# Patient Record
Sex: MALE | Race: WHITE | NOT HISPANIC OR LATINO | URBAN - METROPOLITAN AREA
[De-identification: names, ages, dates, MRNs, and addresses within clinical notes are randomized per-mention and may not be internally consistent; named-entity substitution may affect disease eponyms.]

---

## 2017-01-03 ENCOUNTER — APPOINTMENT (RX ONLY)
Dept: URBAN - METROPOLITAN AREA CLINIC 157 | Facility: CLINIC | Age: 62
Setting detail: DERMATOLOGY
End: 2017-01-03

## 2017-01-03 DIAGNOSIS — L73.8 OTHER SPECIFIED FOLLICULAR DISORDERS: ICD-10-CM

## 2017-01-03 DIAGNOSIS — L82.0 INFLAMED SEBORRHEIC KERATOSIS: ICD-10-CM

## 2017-01-03 PROBLEM — D48.5 NEOPLASM OF UNCERTAIN BEHAVIOR OF SKIN: Status: ACTIVE | Noted: 2017-01-03

## 2017-01-03 PROBLEM — D23.5 OTHER BENIGN NEOPLASM OF SKIN OF TRUNK: Status: ACTIVE | Noted: 2017-01-03

## 2017-01-03 PROCEDURE — 11100: CPT

## 2017-01-03 PROCEDURE — 99214 OFFICE O/P EST MOD 30 MIN: CPT | Mod: 25

## 2017-01-03 PROCEDURE — ? BIOPSY BY SHAVE METHOD

## 2017-01-03 PROCEDURE — ? COUNSELING

## 2017-01-03 ASSESSMENT — LOCATION SIMPLE DESCRIPTION DERM
LOCATION SIMPLE: LEFT CHEEK
LOCATION SIMPLE: ABDOMEN

## 2017-01-03 ASSESSMENT — LOCATION ZONE DERM
LOCATION ZONE: TRUNK
LOCATION ZONE: FACE

## 2017-01-03 ASSESSMENT — LOCATION DETAILED DESCRIPTION DERM
LOCATION DETAILED: RIGHT LATERAL ABDOMEN
LOCATION DETAILED: LEFT INFERIOR CENTRAL MALAR CHEEK

## 2017-01-03 NOTE — PROCEDURE: BIOPSY BY SHAVE METHOD
Biopsy Method: sterile single edge surgical blade
Electrodesiccation And Curettage Text: The wound bed was treated with electrodesiccation and curettage after the biopsy was performed.
Dressing: bandage
X Size Of Lesion In Cm: 0
Post-Care Instructions: I reviewed with the patient in detail post-care instructions. Patient is to keep the biopsy site dry overnight, and then apply bacitracin twice daily until healed. Patient may apply hydrogen peroxide soaks to remove any crusting.
Lab: [id number]
Bill 50449 For Specimen Handling/Conveyance To Laboratory?: no
Biopsy Type: H and E
Detail Level: Detailed
Cryotherapy Text: The wound bed was treated with cryotherapy after the biopsy was performed.
Electrodesiccation Text: The wound bed was treated with electrodesiccation after the biopsy was performed.
Billing Type: Third-Party Bill
Wound Care: Bacitracin
Anesthesia Type: 2% lidocaine with epinephrine
Render Post-Care Instructions In Note?: yes
Hemostasis: Drysol
Consent: Written consent was obtained and risks were reviewed including but not limited to scarring, infection, bleeding, scabbing, incomplete removal, nerve damage and allergy to anesthesia.
Notification Instructions: Patient will be notified of biopsy results. However, patient instructed to call the office if not contacted within 2 weeks.
Type Of Destruction Used: Curettage
Anesthesia Volume In Cc (Will Not Render If 0): 0.5
Curettage Text: The wound bed was treated with curettage after the biops
Lab Facility: [id number]
Silver Nitrate Text: The wound bed was treated with silver nitrate after the biopsy was performed.

## 2017-09-05 ENCOUNTER — APPOINTMENT (RX ONLY)
Dept: URBAN - METROPOLITAN AREA CLINIC 157 | Facility: CLINIC | Age: 62
Setting detail: DERMATOLOGY
End: 2017-09-05

## 2017-09-05 VITALS
WEIGHT: 170 LBS | DIASTOLIC BLOOD PRESSURE: 69 MMHG | SYSTOLIC BLOOD PRESSURE: 111 MMHG | HEIGHT: 71 IN | HEART RATE: 71 BPM

## 2017-09-05 DIAGNOSIS — L82.1 OTHER SEBORRHEIC KERATOSIS: ICD-10-CM

## 2017-09-05 DIAGNOSIS — L72.8 OTHER FOLLICULAR CYSTS OF THE SKIN AND SUBCUTANEOUS TISSUE: ICD-10-CM

## 2017-09-05 PROBLEM — Z85.46 PERSONAL HISTORY OF MALIGNANT NEOPLASM OF PROSTATE: Status: ACTIVE | Noted: 2017-09-05

## 2017-09-05 PROCEDURE — ? COUNSELING

## 2017-09-05 PROCEDURE — 99213 OFFICE O/P EST LOW 20 MIN: CPT

## 2017-09-05 ASSESSMENT — LOCATION ZONE DERM: LOCATION ZONE: TRUNK

## 2017-09-05 ASSESSMENT — LOCATION SIMPLE DESCRIPTION DERM
LOCATION SIMPLE: UPPER BACK
LOCATION SIMPLE: RIGHT UPPER BACK

## 2017-09-05 ASSESSMENT — LOCATION DETAILED DESCRIPTION DERM
LOCATION DETAILED: INFERIOR THORACIC SPINE
LOCATION DETAILED: RIGHT SUPERIOR UPPER BACK

## 2022-09-15 ENCOUNTER — PATIENT MESSAGE (OUTPATIENT)
Dept: FAMILY MEDICINE CLINIC | Facility: CLINIC | Age: 67
End: 2022-09-15

## 2022-09-15 NOTE — TELEPHONE ENCOUNTER
From: Margo Madrid  Sent: 9/15/2022 1:11 PM CDT  To: [facility] Clinical Staff  Subject: Upcoming Appointment    Hayes Carrasco. I was not able to complete the e-check in. It wouldn't accept my Medicare card number for some reason. I called the [facility] help number and they said to just check in when I get there so will come a bit early to do that. I am thinking that I am listed as Margo Madrid in [address] and as Berwyn Dandy on my Medicare card. Also, I was wondering if I could go ahead and get my flu shot tomorrow? Thanks.   [facility]

## 2022-09-16 ENCOUNTER — OFFICE VISIT (OUTPATIENT)
Dept: FAMILY MEDICINE CLINIC | Facility: CLINIC | Age: 67
End: 2022-09-16
Payer: MEDICARE

## 2022-09-16 ENCOUNTER — LAB ENCOUNTER (OUTPATIENT)
Dept: LAB | Age: 67
End: 2022-09-16
Attending: FAMILY MEDICINE
Payer: MEDICARE

## 2022-09-16 VITALS
HEIGHT: 69 IN | OXYGEN SATURATION: 98 % | RESPIRATION RATE: 16 BRPM | SYSTOLIC BLOOD PRESSURE: 104 MMHG | DIASTOLIC BLOOD PRESSURE: 70 MMHG | WEIGHT: 182 LBS | BODY MASS INDEX: 26.96 KG/M2 | HEART RATE: 66 BPM

## 2022-09-16 DIAGNOSIS — M10.9 GOUT, UNSPECIFIED CAUSE, UNSPECIFIED CHRONICITY, UNSPECIFIED SITE: ICD-10-CM

## 2022-09-16 DIAGNOSIS — Z85.46 HISTORY OF PROSTATE CANCER: ICD-10-CM

## 2022-09-16 DIAGNOSIS — Z00.00 ENCOUNTER FOR ANNUAL HEALTH EXAMINATION: Primary | ICD-10-CM

## 2022-09-16 DIAGNOSIS — K42.9 UMBILICAL HERNIA WITHOUT OBSTRUCTION AND WITHOUT GANGRENE: ICD-10-CM

## 2022-09-16 LAB
ALBUMIN SERPL-MCNC: 3.8 G/DL (ref 3.4–5)
ALBUMIN/GLOB SERPL: 1.1 {RATIO} (ref 1–2)
ALP LIVER SERPL-CCNC: 50 U/L
ALT SERPL-CCNC: 43 U/L
ANION GAP SERPL CALC-SCNC: 6 MMOL/L (ref 0–18)
AST SERPL-CCNC: 15 U/L (ref 15–37)
BILIRUB SERPL-MCNC: 1.1 MG/DL (ref 0.1–2)
BUN BLD-MCNC: 16 MG/DL (ref 7–18)
CALCIUM BLD-MCNC: 9.1 MG/DL (ref 8.5–10.1)
CHLORIDE SERPL-SCNC: 106 MMOL/L (ref 98–112)
CO2 SERPL-SCNC: 28 MMOL/L (ref 21–32)
CREAT BLD-MCNC: 1.11 MG/DL
FASTING STATUS PATIENT QL REPORTED: YES
GFR SERPLBLD BASED ON 1.73 SQ M-ARVRAT: 73 ML/MIN/1.73M2 (ref 60–?)
GLOBULIN PLAS-MCNC: 3.5 G/DL (ref 2.8–4.4)
GLUCOSE BLD-MCNC: 101 MG/DL (ref 70–99)
OSMOLALITY SERPL CALC.SUM OF ELEC: 291 MOSM/KG (ref 275–295)
POTASSIUM SERPL-SCNC: 4.2 MMOL/L (ref 3.5–5.1)
PROT SERPL-MCNC: 7.3 G/DL (ref 6.4–8.2)
SODIUM SERPL-SCNC: 140 MMOL/L (ref 136–145)
URATE SERPL-MCNC: 5.5 MG/DL

## 2022-09-16 PROCEDURE — 80053 COMPREHEN METABOLIC PANEL: CPT

## 2022-09-16 PROCEDURE — 36415 COLL VENOUS BLD VENIPUNCTURE: CPT

## 2022-09-16 PROCEDURE — 84550 ASSAY OF BLOOD/URIC ACID: CPT

## 2022-09-16 PROCEDURE — 99203 OFFICE O/P NEW LOW 30 MIN: CPT | Performed by: FAMILY MEDICINE

## 2022-09-16 RX ORDER — LORATADINE 10 MG/1
10 TABLET ORAL DAILY
COMMUNITY

## 2022-09-16 RX ORDER — ATORVASTATIN CALCIUM 10 MG/1
10 TABLET, FILM COATED ORAL NIGHTLY
COMMUNITY
Start: 2022-07-11 | End: 2022-10-09

## 2022-09-16 RX ORDER — FLUTICASONE PROPIONATE 50 MCG
SPRAY, SUSPENSION (ML) NASAL DAILY
COMMUNITY

## 2022-09-16 RX ORDER — ALLOPURINOL 300 MG/1
150 TABLET ORAL
COMMUNITY
Start: 2022-07-11

## 2022-09-16 RX ORDER — ASPIRIN 81 MG/1
81 TABLET ORAL DAILY
COMMUNITY

## 2022-10-07 ENCOUNTER — NURSE ONLY (OUTPATIENT)
Dept: FAMILY MEDICINE CLINIC | Facility: CLINIC | Age: 67
End: 2022-10-07
Payer: MEDICARE

## 2022-10-07 PROCEDURE — 90662 IIV NO PRSV INCREASED AG IM: CPT | Performed by: FAMILY MEDICINE

## 2022-10-07 PROCEDURE — G0008 ADMIN INFLUENZA VIRUS VAC: HCPCS | Performed by: FAMILY MEDICINE

## 2022-10-18 ENCOUNTER — PATIENT MESSAGE (OUTPATIENT)
Dept: FAMILY MEDICINE CLINIC | Facility: CLINIC | Age: 67
End: 2022-10-18

## 2022-10-19 RX ORDER — ATORVASTATIN CALCIUM 10 MG/1
10 TABLET, FILM COATED ORAL NIGHTLY
Qty: 90 TABLET | Refills: 0 | Status: SHIPPED | OUTPATIENT
Start: 2022-10-19

## 2022-10-19 NOTE — TELEPHONE ENCOUNTER
From: Aneesh Bright  To: Rosa Willis MD  Sent: 10/18/2022 6:40 PM CDT  Subject: Prescription for atorvastatin    I have been on atorvastatin 10 mg once per day. I thought it was listed during my last appointment, but I can't find it in my medication list. I need to get a refill for this please. We use the [facility] at [address] and [address] in [place]. Thanks!   Leandro Cervantes

## 2022-10-26 ENCOUNTER — PATIENT MESSAGE (OUTPATIENT)
Dept: FAMILY MEDICINE CLINIC | Facility: CLINIC | Age: 67
End: 2022-10-26

## 2022-10-26 NOTE — TELEPHONE ENCOUNTER
From: Adrien Russ  To: Abigail Smith MD  Sent: 10/26/2022 2:05 PM CDT  Subject: COVID booster shot    I was wondering if it would be possible for myself and also my wife (Regina Meadows [id number], also a patient of Dr. Ana María Vale) to come in on Thursday (tomorrow 10-) early afternoon to get our Covid booster shots? Thanks.   Vania Faria

## 2022-12-05 ENCOUNTER — OFFICE VISIT (OUTPATIENT)
Facility: LOCATION | Age: 67
End: 2022-12-05
Payer: MEDICARE

## 2022-12-05 VITALS — HEART RATE: 99 BPM | TEMPERATURE: 98 F

## 2022-12-05 DIAGNOSIS — Z90.79 HISTORY OF ROBOT-ASSISTED LAPAROSCOPIC RADICAL PROSTATECTOMY: ICD-10-CM

## 2022-12-05 DIAGNOSIS — K43.2 INCISIONAL HERNIA, WITHOUT OBSTRUCTION OR GANGRENE: Primary | ICD-10-CM

## 2022-12-05 PROCEDURE — 99205 OFFICE O/P NEW HI 60 MIN: CPT | Performed by: SURGERY

## 2022-12-07 DIAGNOSIS — K43.9 VENTRAL HERNIA WITHOUT OBSTRUCTION OR GANGRENE: Primary | ICD-10-CM

## 2023-01-11 RX ORDER — ALLOPURINOL 300 MG/1
150 TABLET ORAL DAILY
Qty: 45 TABLET | Refills: 1 | Status: SHIPPED | OUTPATIENT
Start: 2023-01-11

## 2023-01-11 RX ORDER — ATORVASTATIN CALCIUM 10 MG/1
10 TABLET, FILM COATED ORAL NIGHTLY
Qty: 90 TABLET | Refills: 0 | Status: SHIPPED | OUTPATIENT
Start: 2023-01-11

## 2023-02-06 ENCOUNTER — OFFICE VISIT (OUTPATIENT)
Dept: FAMILY MEDICINE CLINIC | Facility: CLINIC | Age: 68
End: 2023-02-06
Payer: MEDICARE

## 2023-02-06 VITALS
RESPIRATION RATE: 18 BRPM | HEART RATE: 78 BPM | DIASTOLIC BLOOD PRESSURE: 76 MMHG | OXYGEN SATURATION: 99 % | BODY MASS INDEX: 28.29 KG/M2 | HEIGHT: 69 IN | SYSTOLIC BLOOD PRESSURE: 108 MMHG | WEIGHT: 191 LBS

## 2023-02-06 DIAGNOSIS — L98.9 SKIN LESIONS: ICD-10-CM

## 2023-02-06 DIAGNOSIS — L91.8 SKIN TAG: Primary | ICD-10-CM

## 2023-02-20 ENCOUNTER — PATIENT MESSAGE (OUTPATIENT)
Facility: LOCATION | Age: 68
End: 2023-02-20

## 2023-02-20 NOTE — TELEPHONE ENCOUNTER
From: Greta Robles  To: Esdras Covarrubias MD  Sent: 2/20/2023 10:20 AM CST  Subject: question about meds prior to proceedure    I have continued to take allopurinal and atorvastatin and have a procedure scheduled for Monday Feb 27. I stopped aspirin and vitamins on Thursday Feb 16. I also stopped taking loratidine, but was wondering if it is ok to continue taking it.  Thanks

## 2023-02-25 ENCOUNTER — LAB ENCOUNTER (OUTPATIENT)
Dept: LAB | Facility: HOSPITAL | Age: 68
End: 2023-02-25
Attending: SURGERY
Payer: MEDICARE

## 2023-02-25 DIAGNOSIS — Z01.818 PRE-OP TESTING: ICD-10-CM

## 2023-02-25 LAB — SARS-COV-2 RNA RESP QL NAA+PROBE: NOT DETECTED

## 2023-02-26 ENCOUNTER — ANESTHESIA EVENT (OUTPATIENT)
Dept: SURGERY | Facility: HOSPITAL | Age: 68
End: 2023-02-26
Payer: MEDICARE

## 2023-02-27 ENCOUNTER — HOSPITAL ENCOUNTER (OUTPATIENT)
Facility: HOSPITAL | Age: 68
Setting detail: HOSPITAL OUTPATIENT SURGERY
Discharge: HOME | End: 2023-02-27
Attending: SURGERY | Admitting: SURGERY
Payer: MEDICARE

## 2023-02-27 ENCOUNTER — ANESTHESIA (OUTPATIENT)
Dept: SURGERY | Facility: HOSPITAL | Age: 68
End: 2023-02-27
Payer: MEDICARE

## 2023-02-27 VITALS
OXYGEN SATURATION: 94 % | WEIGHT: 186 LBS | HEART RATE: 71 BPM | SYSTOLIC BLOOD PRESSURE: 124 MMHG | RESPIRATION RATE: 16 BRPM | HEIGHT: 69 IN | TEMPERATURE: 99 F | DIASTOLIC BLOOD PRESSURE: 66 MMHG | BODY MASS INDEX: 27.55 KG/M2

## 2023-02-27 DIAGNOSIS — Z01.818 PRE-OP TESTING: Primary | ICD-10-CM

## 2023-02-27 PROCEDURE — 0WQF0ZZ REPAIR ABDOMINAL WALL, OPEN APPROACH: ICD-10-PCS | Performed by: SURGERY

## 2023-02-27 RX ORDER — HYDROCODONE BITARTRATE AND ACETAMINOPHEN 5; 325 MG/1; MG/1
2 TABLET ORAL ONCE AS NEEDED
Status: DISCONTINUED | OUTPATIENT
Start: 2023-02-27 | End: 2023-02-27

## 2023-02-27 RX ORDER — SODIUM CHLORIDE, SODIUM LACTATE, POTASSIUM CHLORIDE, CALCIUM CHLORIDE 600; 310; 30; 20 MG/100ML; MG/100ML; MG/100ML; MG/100ML
INJECTION, SOLUTION INTRAVENOUS CONTINUOUS
Status: DISCONTINUED | OUTPATIENT
Start: 2023-02-27 | End: 2023-02-27

## 2023-02-27 RX ORDER — HYDROCODONE BITARTRATE AND ACETAMINOPHEN 5; 325 MG/1; MG/1
1 TABLET ORAL ONCE AS NEEDED
Status: DISCONTINUED | OUTPATIENT
Start: 2023-02-27 | End: 2023-02-27

## 2023-02-27 RX ORDER — ACETAMINOPHEN 500 MG
1000 TABLET ORAL ONCE AS NEEDED
Status: DISCONTINUED | OUTPATIENT
Start: 2023-02-27 | End: 2023-02-27

## 2023-02-27 RX ORDER — LIDOCAINE HYDROCHLORIDE 10 MG/ML
INJECTION, SOLUTION EPIDURAL; INFILTRATION; INTRACAUDAL; PERINEURAL AS NEEDED
Status: DISCONTINUED | OUTPATIENT
Start: 2023-02-27 | End: 2023-02-27 | Stop reason: SURG

## 2023-02-27 RX ORDER — LIDOCAINE HYDROCHLORIDE AND EPINEPHRINE 10; 10 MG/ML; UG/ML
INJECTION, SOLUTION INFILTRATION; PERINEURAL AS NEEDED
Status: DISCONTINUED | OUTPATIENT
Start: 2023-02-27 | End: 2023-02-27 | Stop reason: HOSPADM

## 2023-02-27 RX ORDER — ACETAMINOPHEN 500 MG
1000 TABLET ORAL ONCE
Status: DISCONTINUED | OUTPATIENT
Start: 2023-02-27 | End: 2023-02-27 | Stop reason: HOSPADM

## 2023-02-27 RX ORDER — CEFAZOLIN SODIUM/WATER 2 G/20 ML
2 SYRINGE (ML) INTRAVENOUS ONCE
Status: COMPLETED | OUTPATIENT
Start: 2023-02-27 | End: 2023-02-27

## 2023-02-27 RX ORDER — HYDROMORPHONE HYDROCHLORIDE 1 MG/ML
0.6 INJECTION, SOLUTION INTRAMUSCULAR; INTRAVENOUS; SUBCUTANEOUS EVERY 5 MIN PRN
Status: DISCONTINUED | OUTPATIENT
Start: 2023-02-27 | End: 2023-02-27

## 2023-02-27 RX ORDER — HYDROMORPHONE HYDROCHLORIDE 1 MG/ML
0.4 INJECTION, SOLUTION INTRAMUSCULAR; INTRAVENOUS; SUBCUTANEOUS EVERY 5 MIN PRN
Status: DISCONTINUED | OUTPATIENT
Start: 2023-02-27 | End: 2023-02-27

## 2023-02-27 RX ORDER — HEPARIN SODIUM 5000 [USP'U]/ML
5000 INJECTION, SOLUTION INTRAVENOUS; SUBCUTANEOUS ONCE
Status: COMPLETED | OUTPATIENT
Start: 2023-02-27 | End: 2023-02-27

## 2023-02-27 RX ORDER — HYDROMORPHONE HYDROCHLORIDE 1 MG/ML
0.2 INJECTION, SOLUTION INTRAMUSCULAR; INTRAVENOUS; SUBCUTANEOUS EVERY 5 MIN PRN
Status: DISCONTINUED | OUTPATIENT
Start: 2023-02-27 | End: 2023-02-27

## 2023-02-27 RX ORDER — HYDROCODONE BITARTRATE AND ACETAMINOPHEN 5; 325 MG/1; MG/1
1 TABLET ORAL EVERY 6 HOURS PRN
Qty: 15 TABLET | Refills: 0 | Status: SHIPPED | OUTPATIENT
Start: 2023-02-27

## 2023-02-27 RX ORDER — ONDANSETRON 2 MG/ML
INJECTION INTRAMUSCULAR; INTRAVENOUS AS NEEDED
Status: DISCONTINUED | OUTPATIENT
Start: 2023-02-27 | End: 2023-02-27 | Stop reason: SURG

## 2023-02-27 RX ORDER — NALOXONE HYDROCHLORIDE 0.4 MG/ML
80 INJECTION, SOLUTION INTRAMUSCULAR; INTRAVENOUS; SUBCUTANEOUS AS NEEDED
Status: DISCONTINUED | OUTPATIENT
Start: 2023-02-27 | End: 2023-02-27

## 2023-02-27 RX ORDER — DEXAMETHASONE SODIUM PHOSPHATE 4 MG/ML
VIAL (ML) INJECTION AS NEEDED
Status: DISCONTINUED | OUTPATIENT
Start: 2023-02-27 | End: 2023-02-27 | Stop reason: SURG

## 2023-02-27 RX ORDER — ONDANSETRON 2 MG/ML
4 INJECTION INTRAMUSCULAR; INTRAVENOUS EVERY 6 HOURS PRN
Status: DISCONTINUED | OUTPATIENT
Start: 2023-02-27 | End: 2023-02-27

## 2023-02-27 RX ORDER — METOCLOPRAMIDE HYDROCHLORIDE 5 MG/ML
10 INJECTION INTRAMUSCULAR; INTRAVENOUS EVERY 8 HOURS PRN
Status: DISCONTINUED | OUTPATIENT
Start: 2023-02-27 | End: 2023-02-27

## 2023-02-27 RX ORDER — KETOROLAC TROMETHAMINE 30 MG/ML
INJECTION, SOLUTION INTRAMUSCULAR; INTRAVENOUS AS NEEDED
Status: DISCONTINUED | OUTPATIENT
Start: 2023-02-27 | End: 2023-02-27 | Stop reason: SURG

## 2023-02-27 RX ORDER — BUPIVACAINE HYDROCHLORIDE 5 MG/ML
INJECTION, SOLUTION EPIDURAL; INTRACAUDAL AS NEEDED
Status: DISCONTINUED | OUTPATIENT
Start: 2023-02-27 | End: 2023-02-27 | Stop reason: HOSPADM

## 2023-02-27 RX ADMIN — CEFAZOLIN SODIUM/WATER 2 G: 2 G/20 ML SYRINGE (ML) INTRAVENOUS at 12:31:00

## 2023-02-27 RX ADMIN — LIDOCAINE HYDROCHLORIDE 50 MG: 10 INJECTION, SOLUTION EPIDURAL; INFILTRATION; INTRACAUDAL; PERINEURAL at 12:31:00

## 2023-02-27 RX ADMIN — SODIUM CHLORIDE, SODIUM LACTATE, POTASSIUM CHLORIDE, CALCIUM CHLORIDE: 600; 310; 30; 20 INJECTION, SOLUTION INTRAVENOUS at 13:17:00

## 2023-02-27 RX ADMIN — KETOROLAC TROMETHAMINE 30 MG: 30 INJECTION, SOLUTION INTRAMUSCULAR; INTRAVENOUS at 13:00:00

## 2023-02-27 RX ADMIN — SODIUM CHLORIDE, SODIUM LACTATE, POTASSIUM CHLORIDE, CALCIUM CHLORIDE: 600; 310; 30; 20 INJECTION, SOLUTION INTRAVENOUS at 12:28:00

## 2023-02-27 RX ADMIN — DEXAMETHASONE SODIUM PHOSPHATE 4 MG: 4 MG/ML VIAL (ML) INJECTION at 12:36:00

## 2023-02-27 RX ADMIN — ONDANSETRON 4 MG: 2 INJECTION INTRAMUSCULAR; INTRAVENOUS at 13:00:00

## 2023-02-27 NOTE — ANESTHESIA POSTPROCEDURE EVALUATION
[address] Patient Status:  Hospital Outpatient Surgery   Age/Gender 79year old male MRN [id number]   [facility] SURGERY Attending Jeffery Lowry MD   [facility] Day # 0 PCP Wilfredo Arredondo MD       Anesthesia Post-op Note    VENTRAL HERNIA REPAIR    Procedure Summary     Date: 02/27/23 Room / Location: [facility] MAIN OR 04 / [facility] MAIN OR    Anesthesia Start: 8810 Anesthesia Stop: 5967    Procedure: VENTRAL HERNIA REPAIR Diagnosis:       Ventral hernia without obstruction or gangrene      (Ventral hernia without obstruction or gangrene [K43.9])    Surgeons: Jeffery Lowry MD Anesthesiologist: Krysten Díaz MD    Anesthesia Type: general ASA Status: 2          Anesthesia Type: general    Vitals Value Taken Time   /59 02/27/23 1317   Temp 97.5 02/27/23 1317   Pulse 78 02/27/23 1317   Resp 14 02/27/23 1317   SpO2 100 02/27/23 1317       Patient Location: PACU    Anesthesia Type: general    Airway Patency: patent    Postop Pain Control: adequate    Mental Status: mildly sedated but able to meaningfully participate in the post-anesthesia evaluation    Nausea/Vomiting: none    Cardiopulmonary/Hydration status: stable euvolemic    Complications: no apparent anesthesia related complications    Postop vital signs: stable    Comments: signout given to PACU JAC Self    Dental Exam: Unchanged from Preop    Patient to be discharged from PACU when criteria met.

## 2023-02-27 NOTE — ANESTHESIA PROCEDURE NOTES
Airway  Date/Time: 2/27/2023 12:32 PM  Urgency: elective    Airway not difficult    General Information and Staff    Patient location during procedure: OR  Anesthesiologist: Juan Bernal MD  Performed: anesthesiologist   Performed by: Juan Bernal MD  Authorized by: Juan Bernal MD      Indications and Patient Condition  Indications for airway management: anesthesia  Spontaneous Ventilation: absent  Sedation level: deep  Preoxygenated: yes  Patient position: sniffing  Mask difficulty assessment: 1 - vent by mask    Final Airway Details  Final airway type: supraglottic airway      Successful airway: classic  Size 3      Number of attempts at approach: 1  Number of other approaches attempted: 0    Additional Comments  Smooth induction. Eyes taped after induction, prior to LMA placement. LMA successfully placed. One pass, well seated. Atraumatic, no complications.

## 2023-03-09 ENCOUNTER — OFFICE VISIT (OUTPATIENT)
Facility: LOCATION | Age: 68
End: 2023-03-09

## 2023-03-09 VITALS — HEART RATE: 78 BPM | TEMPERATURE: 98 F

## 2023-03-09 DIAGNOSIS — Z48.89 ENCOUNTER FOR POSTOPERATIVE WOUND CHECK: ICD-10-CM

## 2023-03-09 DIAGNOSIS — Z98.890 POST-OPERATIVE STATE: Primary | ICD-10-CM

## 2023-03-09 PROCEDURE — 99024 POSTOP FOLLOW-UP VISIT: CPT | Performed by: PHYSICIAN ASSISTANT

## 2023-03-27 ENCOUNTER — OFFICE VISIT (OUTPATIENT)
Dept: SURGERY | Facility: CLINIC | Age: 68
End: 2023-03-27

## 2023-03-27 ENCOUNTER — LAB ENCOUNTER (OUTPATIENT)
Dept: LAB | Age: 68
End: 2023-03-27
Attending: SURGERY
Payer: MEDICARE

## 2023-03-27 DIAGNOSIS — C61 PROSTATE CANCER (HCC): ICD-10-CM

## 2023-03-27 DIAGNOSIS — C61 PROSTATE CANCER (HCC): Primary | ICD-10-CM

## 2023-03-27 DIAGNOSIS — R82.90 URINE FINDING: ICD-10-CM

## 2023-03-27 LAB
APPEARANCE: CLEAR
BILIRUBIN: NEGATIVE
GLUCOSE (URINE DIPSTICK): NEGATIVE MG/DL
KETONES (URINE DIPSTICK): NEGATIVE MG/DL
LEUKOCYTES: NEGATIVE
MULTISTIX LOT#: NORMAL NUMERIC
NITRITE, URINE: NEGATIVE
OCCULT BLOOD: NEGATIVE
PH, URINE: 5.5 (ref 4.5–8)
PROTEIN (URINE DIPSTICK): NEGATIVE MG/DL
PSA SERPL-MCNC: <0.01 NG/ML (ref ?–4)
SPECIFIC GRAVITY: 1.01 (ref 1–1.03)
URINE-COLOR: YELLOW
UROBILINOGEN,SEMI-QN: 0.2 MG/DL (ref 0–1.9)

## 2023-03-27 PROCEDURE — 84153 ASSAY OF PSA TOTAL: CPT

## 2023-03-27 PROCEDURE — 36415 COLL VENOUS BLD VENIPUNCTURE: CPT

## 2023-03-27 NOTE — PROGRESS NOTES
RN faxed the Trimix order to [facility] 3/27/23    Trimix #5  Inject 3 units intracavernosally as instructed  Increase or decreased by 1 units until desired effect achieved.  Maximum dose 40 units   Refills: PRN  Syringe: 1cc: 31 gauge x 5/16\" insulin syringes  Qty: 20

## 2023-03-27 NOTE — PROGRESS NOTES
Hayes Song,    I have reviewed your test results. PSA remains undetectable. Please let me know if you have any questions.     Thanks,  Cha Foley MD

## 2023-04-11 RX ORDER — ALLOPURINOL 300 MG/1
150 TABLET ORAL DAILY
Qty: 45 TABLET | Refills: 1 | Status: SHIPPED | OUTPATIENT
Start: 2023-04-11

## 2023-05-15 RX ORDER — ATORVASTATIN CALCIUM 10 MG/1
10 TABLET, FILM COATED ORAL NIGHTLY
Qty: 90 TABLET | Refills: 0 | Status: SHIPPED | OUTPATIENT
Start: 2023-05-15

## 2023-06-20 ENCOUNTER — OFFICE VISIT (OUTPATIENT)
Dept: FAMILY MEDICINE CLINIC | Facility: CLINIC | Age: 68
End: 2023-06-20
Payer: MEDICARE

## 2023-06-20 VITALS
DIASTOLIC BLOOD PRESSURE: 68 MMHG | HEIGHT: 69 IN | HEART RATE: 78 BPM | TEMPERATURE: 98 F | BODY MASS INDEX: 28.29 KG/M2 | WEIGHT: 191 LBS | SYSTOLIC BLOOD PRESSURE: 124 MMHG | OXYGEN SATURATION: 99 % | RESPIRATION RATE: 18 BRPM

## 2023-06-20 DIAGNOSIS — M10.9 GOUT, UNSPECIFIED CAUSE, UNSPECIFIED CHRONICITY, UNSPECIFIED SITE: ICD-10-CM

## 2023-06-20 DIAGNOSIS — Z13.0 SCREENING FOR DEFICIENCY ANEMIA: ICD-10-CM

## 2023-06-20 DIAGNOSIS — Z00.00 ENCOUNTER FOR ANNUAL HEALTH EXAMINATION: Primary | ICD-10-CM

## 2023-06-20 DIAGNOSIS — E55.9 HYPOVITAMINOSIS D: ICD-10-CM

## 2023-06-20 DIAGNOSIS — E78.5 HYPERLIPIDEMIA LDL GOAL <100: ICD-10-CM

## 2023-06-20 DIAGNOSIS — Z13.21 ENCOUNTER FOR VITAMIN DEFICIENCY SCREENING: ICD-10-CM

## 2023-06-20 PROCEDURE — G0438 PPPS, INITIAL VISIT: HCPCS | Performed by: FAMILY MEDICINE

## 2023-06-22 ENCOUNTER — LAB ENCOUNTER (OUTPATIENT)
Dept: LAB | Age: 68
End: 2023-06-22
Attending: FAMILY MEDICINE
Payer: MEDICARE

## 2023-06-22 DIAGNOSIS — Z13.21 ENCOUNTER FOR VITAMIN DEFICIENCY SCREENING: ICD-10-CM

## 2023-06-22 DIAGNOSIS — M10.9 GOUT, UNSPECIFIED CAUSE, UNSPECIFIED CHRONICITY, UNSPECIFIED SITE: ICD-10-CM

## 2023-06-22 DIAGNOSIS — Z13.0 SCREENING FOR DEFICIENCY ANEMIA: ICD-10-CM

## 2023-06-22 DIAGNOSIS — E55.9 HYPOVITAMINOSIS D: ICD-10-CM

## 2023-06-22 DIAGNOSIS — Z00.00 ENCOUNTER FOR ANNUAL HEALTH EXAMINATION: ICD-10-CM

## 2023-06-22 DIAGNOSIS — E78.5 HYPERLIPIDEMIA LDL GOAL <100: ICD-10-CM

## 2023-06-22 LAB
ALBUMIN SERPL-MCNC: 3.8 G/DL (ref 3.4–5)
ALBUMIN/GLOB SERPL: 1.1 {RATIO} (ref 1–2)
ALP LIVER SERPL-CCNC: 47 U/L
ALT SERPL-CCNC: 45 U/L
ANION GAP SERPL CALC-SCNC: 4 MMOL/L (ref 0–18)
AST SERPL-CCNC: 21 U/L (ref 15–37)
BASOPHILS # BLD AUTO: 0.04 X10(3) UL (ref 0–0.2)
BASOPHILS NFR BLD AUTO: 0.6 %
BILIRUB SERPL-MCNC: 0.8 MG/DL (ref 0.1–2)
BUN BLD-MCNC: 18 MG/DL (ref 7–18)
CALCIUM BLD-MCNC: 9 MG/DL (ref 8.5–10.1)
CHLORIDE SERPL-SCNC: 110 MMOL/L (ref 98–112)
CHOLEST SERPL-MCNC: 133 MG/DL (ref ?–200)
CO2 SERPL-SCNC: 24 MMOL/L (ref 21–32)
CREAT BLD-MCNC: 1.01 MG/DL
EOSINOPHIL # BLD AUTO: 0.12 X10(3) UL (ref 0–0.7)
EOSINOPHIL NFR BLD AUTO: 1.8 %
ERYTHROCYTE [DISTWIDTH] IN BLOOD BY AUTOMATED COUNT: 12.8 %
FASTING PATIENT LIPID ANSWER: YES
FASTING STATUS PATIENT QL REPORTED: YES
GFR SERPLBLD BASED ON 1.73 SQ M-ARVRAT: 82 ML/MIN/1.73M2 (ref 60–?)
GLOBULIN PLAS-MCNC: 3.5 G/DL (ref 2.8–4.4)
GLUCOSE BLD-MCNC: 97 MG/DL (ref 70–99)
HCT VFR BLD AUTO: 46.1 %
HDLC SERPL-MCNC: 43 MG/DL (ref 40–59)
HGB BLD-MCNC: 14.7 G/DL
IMM GRANULOCYTES # BLD AUTO: 0.03 X10(3) UL (ref 0–1)
IMM GRANULOCYTES NFR BLD: 0.4 %
LDLC SERPL CALC-MCNC: 71 MG/DL (ref ?–100)
LYMPHOCYTES # BLD AUTO: 2.03 X10(3) UL (ref 1–4)
LYMPHOCYTES NFR BLD AUTO: 30.3 %
MCH RBC QN AUTO: 30.9 PG (ref 26–34)
MCHC RBC AUTO-ENTMCNC: 31.9 G/DL (ref 31–37)
MCV RBC AUTO: 96.8 FL
MONOCYTES # BLD AUTO: 0.51 X10(3) UL (ref 0.1–1)
MONOCYTES NFR BLD AUTO: 7.6 %
NEUTROPHILS # BLD AUTO: 3.98 X10 (3) UL (ref 1.5–7.7)
NEUTROPHILS # BLD AUTO: 3.98 X10(3) UL (ref 1.5–7.7)
NEUTROPHILS NFR BLD AUTO: 59.3 %
NONHDLC SERPL-MCNC: 90 MG/DL (ref ?–130)
OSMOLALITY SERPL CALC.SUM OF ELEC: 288 MOSM/KG (ref 275–295)
PLATELET # BLD AUTO: 219 10(3)UL (ref 150–450)
POTASSIUM SERPL-SCNC: 4 MMOL/L (ref 3.5–5.1)
PROT SERPL-MCNC: 7.3 G/DL (ref 6.4–8.2)
RBC # BLD AUTO: 4.76 X10(6)UL
SODIUM SERPL-SCNC: 138 MMOL/L (ref 136–145)
TRIGL SERPL-MCNC: 101 MG/DL (ref 30–149)
URATE SERPL-MCNC: 6.4 MG/DL
VIT D+METAB SERPL-MCNC: 51.9 NG/ML (ref 30–100)
VLDLC SERPL CALC-MCNC: 15 MG/DL (ref 0–30)
WBC # BLD AUTO: 6.7 X10(3) UL (ref 4–11)

## 2023-06-22 PROCEDURE — 85025 COMPLETE CBC W/AUTO DIFF WBC: CPT

## 2023-06-22 PROCEDURE — 80053 COMPREHEN METABOLIC PANEL: CPT

## 2023-06-22 PROCEDURE — 84550 ASSAY OF BLOOD/URIC ACID: CPT

## 2023-06-22 PROCEDURE — 36415 COLL VENOUS BLD VENIPUNCTURE: CPT

## 2023-06-22 PROCEDURE — 80061 LIPID PANEL: CPT

## 2023-06-22 PROCEDURE — 82306 VITAMIN D 25 HYDROXY: CPT

## 2023-07-10 RX ORDER — ALLOPURINOL 300 MG/1
150 TABLET ORAL DAILY
Qty: 45 TABLET | Refills: 1 | Status: SHIPPED | OUTPATIENT
Start: 2023-07-10

## 2023-08-28 RX ORDER — ATORVASTATIN CALCIUM 10 MG/1
10 TABLET, FILM COATED ORAL NIGHTLY
Qty: 90 TABLET | Refills: 0 | Status: SHIPPED | OUTPATIENT
Start: 2023-08-28

## 2023-10-10 RX ORDER — ALLOPURINOL 300 MG/1
150 TABLET ORAL DAILY
Qty: 45 TABLET | Refills: 1 | Status: SHIPPED | OUTPATIENT
Start: 2023-10-10

## 2023-10-10 NOTE — TELEPHONE ENCOUNTER
A refill request was received for:  Requested Prescriptions     Pending Prescriptions Disp Refills    allopurinol 300 MG Oral Tab 45 tablet 1     Sig: Take 0.5 tablets (150 mg total) by mouth daily.        Last refill date:   7-10-23    Last office visit: 6-20-23  [facility]    Follow up due:  Future Appointments   Date Time Provider Joycelyn Dey   3/25/2024 10:15 AM Gabrielle Pa MD [facility]

## 2023-12-07 RX ORDER — ATORVASTATIN CALCIUM 10 MG/1
10 TABLET, FILM COATED ORAL NIGHTLY
Qty: 90 TABLET | Refills: 0 | Status: SHIPPED | OUTPATIENT
Start: 2023-12-07

## 2024-01-09 RX ORDER — ALLOPURINOL 300 MG/1
150 TABLET ORAL DAILY
Qty: 45 TABLET | Refills: 1 | Status: SHIPPED | OUTPATIENT
Start: 2024-01-09

## 2024-01-09 NOTE — TELEPHONE ENCOUNTER
A refill request was received for:  Requested Prescriptions     Pending Prescriptions Disp Refills    allopurinol 300 MG Oral Tab 45 tablet 1     Sig: Take 0.5 tablets (150 mg total) by mouth daily.       Last refill date:10/10/2023       Last office visit:6/20/2023 annual     Follow up due:  Future Appointments   Date Time Provider Department Center   3/25/2024 10:15 AM Derick Leon MD [id number]

## 2024-03-11 RX ORDER — ATORVASTATIN CALCIUM 10 MG/1
10 TABLET, FILM COATED ORAL NIGHTLY
Qty: 90 TABLET | Refills: 0 | Status: SHIPPED | OUTPATIENT
Start: 2024-03-11

## 2024-03-25 ENCOUNTER — LAB ENCOUNTER (OUTPATIENT)
Dept: LAB | Age: 69
End: 2024-03-25
Attending: SURGERY
Payer: MEDICARE

## 2024-03-25 ENCOUNTER — OFFICE VISIT (OUTPATIENT)
Dept: SURGERY | Facility: CLINIC | Age: 69
End: 2024-03-25
Payer: COMMERCIAL

## 2024-03-25 DIAGNOSIS — C61 PROSTATE CANCER (HCC): Primary | ICD-10-CM

## 2024-03-25 DIAGNOSIS — C61 PROSTATE CANCER (HCC): ICD-10-CM

## 2024-03-25 DIAGNOSIS — R82.90 URINE FINDING: ICD-10-CM

## 2024-03-25 LAB
APPEARANCE: CLEAR
BILIRUBIN: NEGATIVE
GLUCOSE (URINE DIPSTICK): NEGATIVE MG/DL
KETONES (URINE DIPSTICK): NEGATIVE MG/DL
LEUKOCYTES: NEGATIVE
MULTISTIX LOT#: NORMAL NUMERIC
NITRITE, URINE: NEGATIVE
OCCULT BLOOD: NEGATIVE
PH, URINE: 6 (ref 4.5–8)
PROTEIN (URINE DIPSTICK): NEGATIVE MG/DL
PSA SERPL-MCNC: <0.01 NG/ML (ref ?–4)
SPECIFIC GRAVITY: 1.01 (ref 1–1.03)
URINE-COLOR: YELLOW
UROBILINOGEN,SEMI-QN: 0.2 MG/DL (ref 0–1.9)

## 2024-03-25 PROCEDURE — 36415 COLL VENOUS BLD VENIPUNCTURE: CPT

## 2024-03-25 PROCEDURE — 99214 OFFICE O/P EST MOD 30 MIN: CPT | Performed by: SURGERY

## 2024-03-25 PROCEDURE — 81003 URINALYSIS AUTO W/O SCOPE: CPT | Performed by: SURGERY

## 2024-03-25 PROCEDURE — 84153 ASSAY OF PSA TOTAL: CPT

## 2024-03-25 NOTE — PROGRESS NOTES
Urology Clinic Note    Primary Care Provider:  Agusto Howell MD     Chief Complaint:   Prostate cancer follow-up     HPI:   Duncan Herrera is a 68 year old male with history of GERD, gout, hyperlipidemia, Golden 7 prostate cancer status post robot-assisted laparoscopic radical prostatectomy in June 2011.  Margins were negative.  Initial PSA was 5.2, and since surgery PSA has remained undetectable, last on 3/27/2023.  He is transferring his urologic care to [facility] since he recently moved from [place] given grandkids in the area.  He had an umbilical hernia and recently underwent successful umbilical hernia repair.     He has very minimal stress incontinence and continues regular Kegel exercises.  He uses intracavernosal injections periodically for erectile dysfunction that works well for him (~8 mL of papaverine/phentolamine/PGE1 (TRIMIX) 15mg/0.5mg/5mcg).    At his last visit 1 year ago I transitioned his Trimix order to [facility] Trimix # 5.  He has not really been using this.    AUA symptom score is 4/1 with LUTS.    Urinalysis: Negative    PSA:  Lab Results   Component Value Date    PSA <0.01 03/27/2023        History:     Past Medical History:   Diagnosis Date    Allergies     Cancer (HCC)     High cholesterol     Visual impairment        Past Surgical History:   Procedure Laterality Date    LAPAROSCOPY, SURGICAL PROSTATECTOMY, RETROPUBIC RADICAL, W/NERVE SPARING         No family history on file.    Social History     Socioeconomic History    Marital status:    Tobacco Use    Smoking status: Never    Smokeless tobacco: Never   Vaping Use    Vaping Use: Never used   Substance and Sexual Activity    Alcohol use: Yes    Drug use: Never       Medications (Active prior to today's visit):  Current Outpatient Medications   Medication Sig Dispense Refill    atorvastatin 10 MG Oral Tab Take 1 tablet (10 mg total) by mouth nightly. 90 tablet 0    allopurinol 300 MG Oral Tab Take 0.5 tablets (150 mg  total) by mouth daily. 45 tablet 1    aspirin 81 MG Oral Tab EC Take 1 tablet (81 mg total) by mouth daily.      cholecalciferol 125 MCG (5000 UT) Oral Cap Take 1 capsule (5,000 Units total) by mouth daily.      Multiple Vitamins-Minerals (OCUVITE EYE HEALTH FORMULA) Oral Cap Take 1 capsule by mouth daily.      loratadine 10 MG Oral Tab Take 1 tablet (10 mg total) by mouth daily.      fluticasone propionate 50 MCG/ACT Nasal Suspension by Each Nare route daily.      PAPAVERINE HCL IJ Inject as directed. FOR ED         Allergies:  No Known Allergies    Review of Systems:   A comprehensive 10-point review of systems was completed.  Pertinent positives and negatives are noted in the the HPI.    Physical Exam:   CONSTITUTIONAL: Well developed, well nourished, in no acute distress  NEUROLOGIC: Alert and oriented  HEAD: Normocephalic, atraumatic  EYES: Sclera non-icteric  ENT: Hearing intact, moist mucous membranes  NECK: No obvious goiter or masses  RESPIRATORY: Normal respiratory effort  SKIN: No evident rashes  ABDOMEN: Soft, non-tender, non-distended    Assessment & Plan:   Duncan Herrera is a 68 year old male with history of GERD, gout, hyperlipidemia, Jefferson 7 prostate cancer status post robot-assisted laparoscopic radical prostatectomy in June 2011.  Margins were negative.  Initial PSA was 5.2, and since surgery PSA has remained undetectable, last on 3/27/2023.  He is transferring his urologic care to [facility] since he recently moved from [place] given grandkids in the area.  He had an umbilical hernia and recently underwent successful umbilical hernia repair.     He has very minimal stress incontinence and continues regular Kegel exercises.  He uses intracavernosal injections periodically for erectile dysfunction that works well for him (~8 mL of papaverine/phentolamine/PGE1 (TRIMIX) 15mg/0.5mg/5mcg).    At his last visit 1 year ago I transitioned his Trimix order to [facility] Trimix # 5.  He has not  really been using this.    -Repeat PSA now, then yearly  -Continue Trimix as needed  -Continue Kegel exercises    In total, 30 minutes were spent on this patient encounter (including chart review, patient history, physical, and counseling, documentation, and communication).    Derick Leon MD  Staff Urologist  [facility]  Office: [phone]

## 2024-03-25 NOTE — PROGRESS NOTES
Hayes Song,    I have reviewed your test results.  Your PSA remains undetectable.  Please let me know if you have any questions.    Thanks,  Derick Leon MD

## 2024-04-10 RX ORDER — ALLOPURINOL 300 MG/1
150 TABLET ORAL DAILY
Qty: 45 TABLET | Refills: 1 | Status: SHIPPED | OUTPATIENT
Start: 2024-04-10

## 2024-05-07 ENCOUNTER — PATIENT MESSAGE (OUTPATIENT)
Dept: SURGERY | Facility: CLINIC | Age: 69
End: 2024-05-07

## 2024-05-31 RX ORDER — ATORVASTATIN CALCIUM 10 MG/1
10 TABLET, FILM COATED ORAL NIGHTLY
Qty: 90 TABLET | Refills: 0 | Status: SHIPPED | OUTPATIENT
Start: 2024-05-31

## 2024-06-24 ENCOUNTER — OFFICE VISIT (OUTPATIENT)
Dept: FAMILY MEDICINE CLINIC | Facility: CLINIC | Age: 69
End: 2024-06-24

## 2024-06-24 ENCOUNTER — HOSPITAL ENCOUNTER (OUTPATIENT)
Dept: GENERAL RADIOLOGY | Age: 69
Discharge: HOME | End: 2024-06-24
Attending: FAMILY MEDICINE

## 2024-06-24 ENCOUNTER — LAB ENCOUNTER (OUTPATIENT)
Dept: LAB | Age: 69
End: 2024-06-24
Attending: FAMILY MEDICINE

## 2024-06-24 VITALS
RESPIRATION RATE: 16 BRPM | HEART RATE: 68 BPM | HEIGHT: 69 IN | DIASTOLIC BLOOD PRESSURE: 70 MMHG | OXYGEN SATURATION: 98 % | WEIGHT: 187 LBS | SYSTOLIC BLOOD PRESSURE: 110 MMHG | BODY MASS INDEX: 27.7 KG/M2

## 2024-06-24 DIAGNOSIS — G89.29 CHRONIC PAIN OF RIGHT KNEE: ICD-10-CM

## 2024-06-24 DIAGNOSIS — Z13.21 ENCOUNTER FOR VITAMIN DEFICIENCY SCREENING: ICD-10-CM

## 2024-06-24 DIAGNOSIS — M10.9 GOUT, UNSPECIFIED CAUSE, UNSPECIFIED CHRONICITY, UNSPECIFIED SITE: ICD-10-CM

## 2024-06-24 DIAGNOSIS — K64.8 INTERNAL HEMORRHOIDS: ICD-10-CM

## 2024-06-24 DIAGNOSIS — Z00.00 ENCOUNTER FOR ANNUAL HEALTH EXAMINATION: ICD-10-CM

## 2024-06-24 DIAGNOSIS — Z85.46 HISTORY OF PROSTATE CANCER: ICD-10-CM

## 2024-06-24 DIAGNOSIS — Z13.0 SCREENING FOR DEFICIENCY ANEMIA: ICD-10-CM

## 2024-06-24 DIAGNOSIS — M25.561 CHRONIC PAIN OF RIGHT KNEE: ICD-10-CM

## 2024-06-24 DIAGNOSIS — L98.9 SKIN LESIONS: ICD-10-CM

## 2024-06-24 DIAGNOSIS — E78.5 HYPERLIPIDEMIA LDL GOAL <100: ICD-10-CM

## 2024-06-24 DIAGNOSIS — Z00.00 ENCOUNTER FOR ANNUAL HEALTH EXAMINATION: Primary | ICD-10-CM

## 2024-06-24 DIAGNOSIS — J30.9 ALLERGIC RHINITIS, UNSPECIFIED SEASONALITY, UNSPECIFIED TRIGGER: ICD-10-CM

## 2024-06-24 DIAGNOSIS — Z13.6 SCREENING FOR CARDIOVASCULAR CONDITION: ICD-10-CM

## 2024-06-24 LAB
ALBUMIN SERPL-MCNC: 4 G/DL (ref 3.4–5)
ALBUMIN/GLOB SERPL: 1.1 {RATIO} (ref 1–2)
ALP LIVER SERPL-CCNC: 51 U/L
ALT SERPL-CCNC: 49 U/L
ANION GAP SERPL CALC-SCNC: 5 MMOL/L (ref 0–18)
AST SERPL-CCNC: 15 U/L (ref 15–37)
BASOPHILS # BLD AUTO: 0.03 X10(3) UL (ref 0–0.2)
BASOPHILS NFR BLD AUTO: 0.5 %
BILIRUB SERPL-MCNC: 0.9 MG/DL (ref 0.1–2)
BUN BLD-MCNC: 18 MG/DL (ref 9–23)
CALCIUM BLD-MCNC: 9.1 MG/DL (ref 8.5–10.1)
CHLORIDE SERPL-SCNC: 108 MMOL/L (ref 98–112)
CHOLEST SERPL-MCNC: 146 MG/DL (ref ?–200)
CO2 SERPL-SCNC: 26 MMOL/L (ref 21–32)
CREAT BLD-MCNC: 0.99 MG/DL
EGFRCR SERPLBLD CKD-EPI 2021: 83 ML/MIN/1.73M2 (ref 60–?)
EOSINOPHIL # BLD AUTO: 0.07 X10(3) UL (ref 0–0.7)
EOSINOPHIL NFR BLD AUTO: 1.1 %
ERYTHROCYTE [DISTWIDTH] IN BLOOD BY AUTOMATED COUNT: 12.6 %
FASTING PATIENT LIPID ANSWER: YES
FASTING STATUS PATIENT QL REPORTED: YES
GLOBULIN PLAS-MCNC: 3.5 G/DL (ref 2.8–4.4)
GLUCOSE BLD-MCNC: 103 MG/DL (ref 70–99)
HCT VFR BLD AUTO: 45.1 %
HDLC SERPL-MCNC: 45 MG/DL (ref 40–59)
HGB BLD-MCNC: 15.2 G/DL
IMM GRANULOCYTES # BLD AUTO: 0.02 X10(3) UL (ref 0–1)
IMM GRANULOCYTES NFR BLD: 0.3 %
LDLC SERPL CALC-MCNC: 75 MG/DL (ref ?–100)
LYMPHOCYTES # BLD AUTO: 1.66 X10(3) UL (ref 1–4)
LYMPHOCYTES NFR BLD AUTO: 26.3 %
MCH RBC QN AUTO: 31.4 PG (ref 26–34)
MCHC RBC AUTO-ENTMCNC: 33.7 G/DL (ref 31–37)
MCV RBC AUTO: 93.2 FL
MONOCYTES # BLD AUTO: 0.43 X10(3) UL (ref 0.1–1)
MONOCYTES NFR BLD AUTO: 6.8 %
NEUTROPHILS # BLD AUTO: 4.11 X10 (3) UL (ref 1.5–7.7)
NEUTROPHILS # BLD AUTO: 4.11 X10(3) UL (ref 1.5–7.7)
NEUTROPHILS NFR BLD AUTO: 65 %
NONHDLC SERPL-MCNC: 101 MG/DL (ref ?–130)
OSMOLALITY SERPL CALC.SUM OF ELEC: 290 MOSM/KG (ref 275–295)
PLATELET # BLD AUTO: 226 10(3)UL (ref 150–450)
POTASSIUM SERPL-SCNC: 4.2 MMOL/L (ref 3.5–5.1)
PROT SERPL-MCNC: 7.5 G/DL (ref 6.4–8.2)
RBC # BLD AUTO: 4.84 X10(6)UL
SODIUM SERPL-SCNC: 139 MMOL/L (ref 136–145)
TRIGL SERPL-MCNC: 151 MG/DL (ref 30–149)
URATE SERPL-MCNC: 5.6 MG/DL
VIT D+METAB SERPL-MCNC: 42.1 NG/ML (ref 30–100)
VLDLC SERPL CALC-MCNC: 23 MG/DL (ref 0–30)
WBC # BLD AUTO: 6.3 X10(3) UL (ref 4–11)

## 2024-06-24 PROCEDURE — 36415 COLL VENOUS BLD VENIPUNCTURE: CPT

## 2024-06-24 PROCEDURE — 85025 COMPLETE CBC W/AUTO DIFF WBC: CPT

## 2024-06-24 PROCEDURE — 80053 COMPREHEN METABOLIC PANEL: CPT

## 2024-06-24 PROCEDURE — 82306 VITAMIN D 25 HYDROXY: CPT

## 2024-06-24 PROCEDURE — 84550 ASSAY OF BLOOD/URIC ACID: CPT

## 2024-06-24 PROCEDURE — 73562 X-RAY EXAM OF KNEE 3: CPT | Performed by: FAMILY MEDICINE

## 2024-06-24 PROCEDURE — 80061 LIPID PANEL: CPT

## 2024-06-24 NOTE — PATIENT INSTRUCTIONS
Duncan Herrera's SCREENING SCHEDULE   Tests on this list are recommended by your physician but may not be covered, or covered at this frequency, by your insurer.   Please check with your insurance carrier before scheduling to verify coverage.   PREVENTATIVE SERVICES FREQUENCY &  COVERAGE DETAILS LAST COMPLETION DATE   Diabetes Screening    Fasting Blood Sugar / Glucose    One screening every 12 months if never tested or if previously tested but not diagnosed with pre-diabetes   One screening every 6 months if diagnosed with pre-diabetes Lab Results   Component Value Date    GLU 97 06/22/2023        Cardiovascular Disease Screening    Lipid Panel  Cholesterol  Lipoprotein (HDL)  Triglycerides Covered every 5 years for all Medicare beneficiaries without apparent signs or symptoms of cardiovascular disease Lab Results   Component Value Date    CHOLEST 133 06/22/2023    HDL 43 06/22/2023    LDL 71 06/22/2023    TRIG 101 06/22/2023         Electrocardiogram (EKG)   Covered if needed at Welcome to Medicare, and non-screening if indicated for medical reasons -      Ultrasound Screening for Abdominal Aortic Aneurysm (AAA) Covered once in a lifetime for one of the following risk factors   • Men who are 65-75 years old and have ever smoked   • Anyone with a family history -     Colorectal Cancer Screening  Covered for ages 50-85; only need ONE of the following:    Colonoscopy   Covered every 10 years    Covered every 2 years if patient is at high risk or previous colonoscopy was abnormal 05/01/2016    Health Maintenance   Topic Date Due   • Colorectal Cancer Screening  05/01/2026       Flexible Sigmoidoscopy   Covered every 4 years -    Fecal Occult Blood Test Covered annually -   Prostate Cancer Screening    Prostate-Specific Antigen (PSA) Annually Lab Results   Component Value Date    PSA <0.01 03/25/2024     Health Maintenance   Topic Date Due   • PSA  03/25/2026      Immunizations    Influenza Covered once per flu  season  Please get every year -  No recommendations at this time    Pneumococcal Each vaccine ([id number] & [id number]) covered once after 65 Prevnar 13: 09/23/2020    [id number]: 09/28/2021     No recommendations at this time    Hepatitis B One screening covered for patients with certain risk factors   -  No recommendations at this time    Tetanus Toxoid Not covered by Medicare Part B unless medically necessary (cut with metal); may be covered with your pharmacy prescription benefits 05/15/2008    Tetanus, Diptheria and Pertusis TD and TDaP Not covered by Medicare Part B -  No recommendations at this time    Zoster Not covered by Medicare Part B; may be covered with your pharmacy  prescription benefits 11/27/2012  No recommendations at this time

## 2024-06-24 NOTE — PROGRESS NOTES
Subjective:   Duncan Herrera is a 68 year old male who presents for a MA (Medicare Advantage) Supervisit (Once per calendar year) and scheduled follow up of multiple significant but stable problems.   Duncan is here for wellness check.    He has had knee issues, currently doing better, but earlier in the year, not sure if did something to strain it, it was quite painful. Had to take tylenol, and advil to be able to sleep. Is the right knee mostly. Will feel, when bad, like it is tight when pulling back    Better now for a couple of months. Even normal for him has some restriction.    Had a GI doc before moving, was told he had hemorrhoids. A few months before he moved, was told he could have them ruber banded    Lost his Father in Law a few weeks ago, but otherwise stable life set up    History/Other:   Fall Risk Assessment:   He has been screened for Falls and is low risk.      Cognitive Assessment:   He had a completely normal cognitive assessment - see flowsheet entries     Functional Ability/Status:   Duncan Herrera has a completely normal functional assessment. See flowsheet for details.        Depression Screening (PHQ-2/PHQ-9): PHQ-2 SCORE: 1  , done 6/23/2024   Feeling down, depressed, or hopeless: 1 (recent death in family)        Advanced Directives:   He does have a Living Will but we do NOT have it on file in Epic.    He does have a POA but we do NOT have it on file in Epic.    Discussed Advance Care Planning with patient (and family/surrogate if present). Standard forms made available to patient in After Visit Summary.      Patient Active Problem List   Diagnosis    AR (allergic rhinitis)    Gout    Hyperlipidemia LDL goal <100    History of prostate cancer     Allergies:  He has No Known Allergies.    Current Medications:  Outpatient Medications Marked as Taking for the 6/24/24 encounter (Office Visit) with Agusto Howell MD   Medication Sig    atorvastatin 10 MG Oral Tab Take 1 tablet  (10 mg total) by mouth nightly.    allopurinol 300 MG Oral Tab Take 0.5 tablets (150 mg total) by mouth daily.    aspirin 81 MG Oral Tab EC Take 1 tablet (81 mg total) by mouth daily.    cholecalciferol 125 MCG (5000 UT) Oral Cap Take 1 capsule (5,000 Units total) by mouth daily.    Multiple Vitamins-Minerals (OCUVITE EYE HEALTH FORMULA) Oral Cap Take 1 capsule by mouth daily.    loratadine 10 MG Oral Tab Take 1 tablet (10 mg total) by mouth daily.    fluticasone propionate 50 MCG/ACT Nasal Suspension by Each Nare route daily.    PAPAVERINE HCL IJ Inject as directed. FOR ED       Medical History:  He  has a past medical history of Allergies, Cancer (HCC), High cholesterol, and Visual impairment.  Surgical History:  He  has a past surgical history that includes laparoscopy, surgical prostatectomy, retropubic radical, w/nerve sparing.   Family History:  His family history is not on file.  Social History:  He  reports that he has never smoked. He has never used smokeless tobacco. He reports current alcohol use. He reports that he does not use drugs.    Tobacco:  He has never smoked tobacco.    CAGE Alcohol Screen:   CAGE screening score of 0 on 6/23/2024, showing low risk of alcohol abuse.      Patient Care Team:  Agusto Howell MD as PCP - General (Family Medicine)    Review of Systems  Negative except as noted in HPI.    Objective:   Physical Exam  General Appearance:  Alert, cooperative, no distress, appears stated age   Head:  Normocephalic, without obvious abnormality, atraumatic   Eyes:  PERRL, conjunctiva/corneas clear, EOM's intact, both eyes   Ears:  Normal TM's and external ear canals, both ears   Nose: Nares normal, septum midline, mucosa normal, no drainage or sinus tenderness   Throat: Lips, mucosa, and tongue normal; teeth and gums normal   Neck: Supple, symmetrical, trachea midline, no adenopathy, thyroid: not enlarged, symmetric, no tenderness/mass/nodules, no carotid bruit or JVD   Back:   Symmetric,  no curvature, ROM normal, no CVA tenderness   Lungs:   Clear to auscultation bilaterally, respirations unlabored   Chest Wall:  No tenderness or deformity   Heart:  Regular rate and rhythm, S1, S2 normal, no murmur, rub or gallop   Abdomen:   Soft, non-tender, bowel sounds active all four quadrants,  no masses, no organomegaly           Extremities: Extremities normal, atraumatic, no cyanosis or edema   Pulses: 2+ and symmetric   Skin: Skin color, texture, turgor normal, no rashes or lesions   Lymph nodes: Cervical, supraclavicular, and axillary nodes normal   Neurologic: Normal     /70   Pulse 68   Resp 16   Ht 5' 9\" (1.753 m)   Wt 187 lb (84.8 kg)   SpO2 98%   BMI 27.62 kg/m²  Estimated body mass index is 27.62 kg/m² as calculated from the following:    Height as of this encounter: 5' 9\" (1.753 m).    Weight as of this encounter: 187 lb (84.8 kg).    Medicare Hearing Assessment:   Hearing Screening    Screening Method: Finger Rub  Finger Rub Result: Pass         Visual Acuity:   Right Eye Visual Acuity: Corrected Right Eye Chart Acuity: 20/20   Left Eye Visual Acuity: Corrected Left Eye Chart Acuity: 20/20   Both Eyes Visual Acuity: Corrected Both Eyes Chart Acuity: 20/20   Able To Tolerate Visual Acuity: Yes        Assessment & Plan:   Duncan Herrera is a 68 year old male who presents for a Medicare Assessment.     1. Encounter for annual health examination (Primary)  -     Lipid Panel; Future; Expected date: 06/24/2024  -     Comp Metabolic Panel (14); Future; Expected date: 06/24/2024  -     CBC With Differential With Platelet; Future; Expected date: 06/24/2024  -     Uric Acid; Future; Expected date: 06/24/2024  2. Hyperlipidemia LDL goal <100 - Controlled with medications, will evaluate, medications refilled recently  -     Lipid Panel; Future; Expected date: 06/24/2024  -     Comp Metabolic Panel (14); Future; Expected date: 06/24/2024  3. Gout, unspecified cause, unspecified chronicity,  unspecified site - Controlled, will check uric acid  -     Uric Acid; Future; Expected date: 06/24/2024  4. Allergic rhinitis, unspecified seasonality, unspecified trigger - No active issues  5. History of prostate cancer - Stable, undetectable PSA, follows with Dr. Leon  6. Screening for deficiency anemia  -     CBC With Differential With Platelet; Future; Expected date: 06/24/2024  7. Screening for cardiovascular condition  -     Lipid Panel; Future; Expected date: 06/24/2024  8. Chronic pain of right knee - Will check for arthritis, if not present, could be meniscus, can watch and wait if positive consider ortho eval  -     XR KNEE (3 VIEWS), RIGHT (CPT=73562); Future; Expected date: 06/24/2024  9. Internal hemorrhoids - Noted in past, some activity, referral for possible treatment  -     Surgery Referral - In Network  10. Skin lesions - Will evaluate with dermatology  -     Derm Referral - External  11. Encounter for vitamin deficiency screening  -     Vitamin D; Future; Expected date: 06/24/2024    The patient indicates understanding of these issues and agrees to the plan.  Reinforced healthy diet, lifestyle, and exercise.      No follow-ups on file.     Agusto Howell MD, 6/24/2024     Supplementary Documentation:   General Health:  In the past six months, have you lost more than 10 pounds without trying?: 2 - No  Has your appetite been poor?: No  Type of Diet: Balanced  How does the patient maintain a good energy level?: Appropriate Exercise;Daily Walks  How would you describe your daily physical activity?: Moderate  How would you describe your current health state?: Good  How do you maintain positive mental well-being?: Social Interaction;Puzzles;Games;Visiting Family  On a scale of 0 to 10, with 0 being no pain and 10 being severe pain, what is your pain level?: 2 - (Mild)  In the past six months, have you experienced urine leakage?: 0-No  At any time do you feel concerned for the safety/well-being of  yourself and/or your children, in your home or elsewhere?: No  Have you had any immunizations at another office such as Influenza, Hepatitis B, Tetanus, or Pneumococcal?: Yes          Duncan Herrera's SCREENING SCHEDULE   Tests on this list are recommended by your physician but may not be covered, or covered at this frequency, by your insurer.   Please check with your insurance carrier before scheduling to verify coverage.   PREVENTATIVE SERVICES FREQUENCY &  COVERAGE DETAILS LAST COMPLETION DATE   Diabetes Screening    Fasting Blood Sugar / Glucose    One screening every 12 months if never tested or if previously tested but not diagnosed with pre-diabetes   One screening every 6 months if diagnosed with pre-diabetes Lab Results   Component Value Date    GLU 97 06/22/2023        Cardiovascular Disease Screening    Lipid Panel  Cholesterol  Lipoprotein (HDL)  Triglycerides Covered every 5 years for all Medicare beneficiaries without apparent signs or symptoms of cardiovascular disease Lab Results   Component Value Date    CHOLEST 133 06/22/2023    HDL 43 06/22/2023    LDL 71 06/22/2023    TRIG 101 06/22/2023         Electrocardiogram (EKG)   Covered if needed at Welcome to Medicare, and non-screening if indicated for medical reasons -      Ultrasound Screening for Abdominal Aortic Aneurysm (AAA) Covered once in a lifetime for one of the following risk factors    Men who are 65-75 years old and have ever smoked    Anyone with a family history -     Colorectal Cancer Screening  Covered for ages 50-85; only need ONE of the following:    Colonoscopy   Covered every 10 years    Covered every 2 years if patient is at high risk or previous colonoscopy was abnormal 05/01/2016    Health Maintenance   Topic Date Due    Colorectal Cancer Screening  05/01/2026       Flexible Sigmoidoscopy   Covered every 4 years -    Fecal Occult Blood Test Covered annually -   Prostate Cancer Screening    Prostate-Specific Antigen (PSA)  Annually Lab Results   Component Value Date    PSA <0.01 03/25/2024     Health Maintenance   Topic Date Due    PSA  03/25/2026      Immunizations    Influenza Covered once per flu season  Please get every year 10/13/2023  No recommendations at this time    Pneumococcal Each vaccine ([id number] & Kuxfpsdmc20) covered once after 65 Prevnar 13: 09/23/2020    Ijwgdzudm89: 09/28/2021     No recommendations at this time    Hepatitis B One screening covered for patients with certain risk factors   -  No recommendations at this time    Tetanus Toxoid Not covered by Medicare Part B unless medically necessary (cut with metal); may be covered with your pharmacy prescription benefits 05/15/2008    Tetanus, Diptheria and Pertusis TD and TDaP Not covered by Medicare Part B -  No recommendations at this time    Zoster Not covered by Medicare Part B; may be covered with your pharmacy  prescription benefits 11/27/2012  No recommendations at this time

## 2024-06-26 ENCOUNTER — PATIENT MESSAGE (OUTPATIENT)
Dept: FAMILY MEDICINE CLINIC | Facility: CLINIC | Age: 69
End: 2024-06-26

## 2024-06-26 NOTE — TELEPHONE ENCOUNTER
From: Duncan Herrera  To: Agusto Howell  Sent: 6/26/2024 10:39 AM CDT  Subject: TDaP immunization    I meant to ask at my recent appointment about my TDaP immunization. Looks like my last one was 2/4/2013. Is this due again? I am going abroad in September this year and was thinking I should update this?  Thanks

## 2024-07-11 ENCOUNTER — PATIENT MESSAGE (OUTPATIENT)
Dept: FAMILY MEDICINE CLINIC | Facility: CLINIC | Age: 69
End: 2024-07-11

## 2024-07-11 DIAGNOSIS — M1A.9XX0 CHRONIC GOUT WITHOUT TOPHUS, UNSPECIFIED CAUSE, UNSPECIFIED SITE: Primary | ICD-10-CM

## 2024-07-12 RX ORDER — ALLOPURINOL 300 MG/1
150 TABLET ORAL DAILY
Qty: 45 TABLET | Refills: 1 | Status: SHIPPED | OUTPATIENT
Start: 2024-07-12

## 2024-07-12 NOTE — TELEPHONE ENCOUNTER
Dr. Howell,  See request  Last refill  4/10/24  Last office visit  6/24/24    Rx pended for [facility]

## 2024-07-12 NOTE — TELEPHONE ENCOUNTER
From: Duncan Herrera  To: Agusto Howell  Sent: 7/11/2024 10:13 AM CDT  Subject: Rx request from [facility]    I am trying to refill my Rx for allopurinol. I am changing to my drug plan's mail order pharmacy which is [facility]. I believe that they may have requested a prescription recently and I was wondering if you could please send them a prescription so that I can get the refill through them.  Thanks!

## 2024-08-08 ENCOUNTER — TELEPHONE (OUTPATIENT)
Dept: FAMILY MEDICINE CLINIC | Facility: CLINIC | Age: 69
End: 2024-08-08

## 2024-08-08 NOTE — TELEPHONE ENCOUNTER
Patient needs to go to pharmacy for TDAP. Cannot give in our office. Patient called and left message to have done at local pharmacy.

## 2024-08-12 ENCOUNTER — PATIENT MESSAGE (OUTPATIENT)
Dept: FAMILY MEDICINE CLINIC | Facility: CLINIC | Age: 69
End: 2024-08-12

## 2024-08-12 RX ORDER — SCOLOPAMINE TRANSDERMAL SYSTEM 1 MG/1
1 PATCH, EXTENDED RELEASE TRANSDERMAL
Qty: 4 PATCH | Refills: 0 | Status: SHIPPED | OUTPATIENT
Start: 2024-08-12

## 2024-08-12 NOTE — TELEPHONE ENCOUNTER
From: Duncan Herrera  To: Agusto Howell  Sent: 8/12/2024 9:44 AM CDT  Subject: patches for cruise    Good morning. I am going on a cruise in early September. I would like to get a prescription for the patches if possible, please. The cruise will last for 10 days.  Thanks.

## 2024-08-12 NOTE — TELEPHONE ENCOUNTER
Approve/deny scopalamine patches for cruise.  Pt states cruise is for 10 days.  Last filled 1/24/23  Last OV 6/24/24

## 2024-08-21 ENCOUNTER — OFFICE VISIT (OUTPATIENT)
Facility: LOCATION | Age: 69
End: 2024-08-21
Payer: MEDICARE

## 2024-08-21 VITALS — HEART RATE: 72 BPM | TEMPERATURE: 98 F

## 2024-08-21 DIAGNOSIS — Z12.11 SCREEN FOR COLON CANCER: Primary | ICD-10-CM

## 2024-08-21 PROCEDURE — 1159F MED LIST DOCD IN RCRD: CPT | Performed by: SURGERY

## 2024-08-21 PROCEDURE — 1170F FXNL STATUS ASSESSED: CPT | Performed by: SURGERY

## 2024-08-21 RX ORDER — POLYETHYLENE GLYCOL 3350, SODIUM CHLORIDE, SODIUM BICARBONATE, POTASSIUM CHLORIDE 420; 11.2; 5.72; 1.48 G/4L; G/4L; G/4L; G/4L
POWDER, FOR SOLUTION ORAL
Qty: 1 EACH | Refills: 0 | Status: SHIPPED | OUTPATIENT
Start: 2024-08-21

## 2024-08-21 NOTE — H&P
Duncan Herrera is a 68 year old male  Chief Complaint   Patient presents with    New Patient     NP- Internal Hemorrhoids- states have the hemorrhoids for the last few years, constipation at times        REFERRED BY    Patient presents with request for evaluation regarding hemorrhoids.  Patient states approximately 5 years ago he was traveling and became constipated and had rectal bleeding.  This lasted for a few weeks and then resolved.  Patient states he does not reduce any tissue back into his rectum spontaneously or manually after a bowel movement.  He states that he has minor burning and itching in the area.  He states that when he wipes he sees just a small amount of bright red blood with excessive wiping.  He does not use any salves or ointments or Preparation H in the area.  He is uncertain whether he should undergo hemorrhoidectomy.  He states his last colonoscopy was 8 years ago he states that colonoscopy was normal however he has had colon polyps on prior colonoscopies       .           Past Medical History:    Allergies    Cancer (HCC)    High cholesterol    Visual impairment     Past Surgical History:   Procedure Laterality Date    Hernia surgery      umbilical hernia repair    Laparoscopy, surgical prostatectomy, retropubic radical, w/nerve sparing       Current Outpatient Medications on File Prior to Visit   Medication Sig Dispense Refill    Scopolamine 1.5mg TD patch 1mg/3days Place 1 patch onto the skin every third day. Start patch 12 hours prior to embarking. 4 patch 0    allopurinol 300 MG Oral Tab Take 0.5 tablets (150 mg total) by mouth daily. 45 tablet 1    atorvastatin 10 MG Oral Tab Take 1 tablet (10 mg total) by mouth nightly. 90 tablet 0    aspirin 81 MG Oral Tab EC Take 1 tablet (81 mg total) by mouth daily.      cholecalciferol 125 MCG (5000 UT) Oral Cap Take 1 capsule (5,000 Units total) by mouth daily.      Multiple Vitamins-Minerals (OCUVITE EYE HEALTH FORMULA) Oral Cap Take 1  capsule by mouth daily.      loratadine 10 MG Oral Tab Take 1 tablet (10 mg total) by mouth daily.      fluticasone propionate 50 MCG/ACT Nasal Suspension by Each Nare route daily.      PAPAVERINE HCL IJ Inject as directed. FOR ED       No current facility-administered medications on file prior to visit.     @ALL@  No family history on file.  Social History     Socioeconomic History    Marital status:    Tobacco Use    Smoking status: Never    Smokeless tobacco: Never   Vaping Use    Vaping status: Never Used   Substance and Sexual Activity    Alcohol use: Yes    Drug use: Never     Social Determinants of Health     Food Insecurity: Unknown (6/18/2024)    Received from [facility] Vital Sign     Worried About Running Out of Food in the Last Year: Never true       ROS     GENERAL HEALTH: otherwise feels well, no weight loss, no fever or chills  SKIN: denies any unusual skin rashes or jaundice  HEENT: denies nasal congestion, sinus pain or sore throat; hearing loss negative,   EYES , no diplopia or vision changes  RESPIRATORY: denies shortness of breath, wheezing or cough   CARDIOVASCULAR: denies chest pain or ORTEGA; no palpitations   GI: denies nausea, vomiting, constipation, diarrhea; no rectal bleeding; no heartburn  GENITAL/: no dysuria, urgency or frequency, no tea colored urine  MUSCULOSKELETAL: no joint complaints upper or lower extremities  HEMATOLOGY: denies hx anemia; denies bruising or excessive bleeding  Endocrine: no weight gain or loss no hot or cold intolerance    EXAM     Pulse 72, temperature 98.1 °F (36.7 °C), temperature source Temporal.  GENERAL: well developed, well nourished male, in no apparent distress.    MENTAL STATUS :Alert, oriented x 3  PSYCH: normal mood and affect  SKIN: anicteric, no rashes, no bruising  EYES: PERRLA, EOMI, sclera anicteric,  conjunctiva without pallor  HEENT: normocephalic, atraumatic, TMs clear, nares patent, mouth moist, pharynx w/o  erythema  NECK: supple, no JVD, no adenopathy, no thyromegaly  RESPIRATORY: clear to auscultation, no rales , rhonchi or wheezing  CARDIOVASCULAR: RRR, murmur negative  ABDOMEN: normal active BS, soft, nondistended  no HSM, no masses or hernias  LYMPHATIC: no axillary , supraclavicular or inguinal lymphadenopathy  EXTREMITIES: no cyanosis, clubbing or edema, no atrophy, full ROM  Rectal examination demonstrates normal anal Derm no evidence of prolapsing hemorrhoids no significant external hemorrhoidal disease  STUDIES:     [facility] Lab Encounter on 06/24/2024   Component Date Value Ref Range Status    Cholesterol, Total 06/24/2024 146  <200 mg/dL Final    Desirable  <200 mg/dL  Borderline  200-239 mg/dL  High      >=240 mg/dL        HDL Cholesterol 06/24/2024 45  40 - 59 mg/dL Final    Interpretive Information:   An HDL cholesterol <40 mg/dL is low and constitutes a coronary heart disease risk factor. An HDL cholesterol >60 mg/dL is a negative risk factor for coronary heart disease.        Triglycerides 06/24/2024 151 (H)  30 - 149 mg/dL Final    Reference interval for fasting triglycerides  Desirable: <150 mg/dL  Borderline: 150-199 mg/dL  High: 200-499 mg/dL  Very High: >=500 mg/dL          LDL Cholesterol 06/24/2024 75  <100 mg/dL Final    Desirable <100 mg/dL   Borderline 100-129 mg/dL   High     >=130mg/dL        VLDL 06/24/2024 23  0 - 30 mg/dL Final    Non HDL Chol 06/24/2024 101  <130 mg/dL Final    Desirable  <130 mg/dL   Borderline  130-159 mg/dL   High        160-189 mg/dL       Very high >=190 mg/dL        Patient Fasting for Lipid? 06/24/2024 Yes   Final    Glucose 06/24/2024 103 (H)  70 - 99 mg/dL Final    Sodium 06/24/2024 139  136 - 145 mmol/L Final    Potassium 06/24/2024 4.2  3.5 - 5.1 mmol/L Final    Chloride 06/24/2024 108  98 - 112 mmol/L Final    CO2 06/24/2024 26.0  21.0 - 32.0 mmol/L Final    Anion Gap 06/24/2024 5  0 - 18 mmol/L Final    BUN 06/24/2024 18  9 - 23 mg/dL Final    Creatinine  06/24/2024 0.99  0.70 - 1.30 mg/dL Final    Calcium, Total 06/24/2024 9.1  8.5 - 10.1 mg/dL Final    Calculated Osmolality 06/24/2024 290  275 - 295 mOsm/kg Final    eGFR-Cr 06/24/2024 83  >=60 mL/min/1.73m2 Final    AST 06/24/2024 15  15 - 37 U/L Final    ALT 06/24/2024 49  16 - 61 U/L Final    Alkaline Phosphatase 06/24/2024 51  45 - 117 U/L Final    Bilirubin, Total 06/24/2024 0.9  0.1 - 2.0 mg/dL Final    Total Protein 06/24/2024 7.5  6.4 - 8.2 g/dL Final    Albumin 06/24/2024 4.0  3.4 - 5.0 g/dL Final    Globulin  06/24/2024 3.5  2.8 - 4.4 g/dL Final    A/G Ratio 06/24/2024 1.1  1.0 - 2.0 Final    Patient Fasting for CMP? 06/24/2024 Yes   Final    Uric Acid 06/24/2024 5.6  3.5 - 7.2 mg/dL Final    WBC 06/24/2024 6.3  4.0 - 11.0 x10(3) uL Final    RBC 06/24/2024 4.84  3.80 - 5.80 x10(6)uL Final    HGB 06/24/2024 15.2  13.0 - 17.5 g/dL Final    HCT 06/24/2024 45.1  39.0 - 53.0 % Final    PLT 06/24/2024 226.0  150.0 - 450.0 10(3)uL Final    MCV 06/24/2024 93.2  80.0 - 100.0 fL Final    MCH 06/24/2024 31.4  26.0 - 34.0 pg Final    MCHC 06/24/2024 33.7  31.0 - 37.0 g/dL Final    RDW 06/24/2024 12.6  % Final    Neutrophil Absolute Prelim 06/24/2024 4.11  1.50 - 7.70 x10 (3) uL Final    Neutrophil Absolute 06/24/2024 4.11  1.50 - 7.70 x10(3) uL Final    Lymphocyte Absolute 06/24/2024 1.66  1.00 - 4.00 x10(3) uL Final    Monocyte Absolute 06/24/2024 0.43  0.10 - 1.00 x10(3) uL Final    Eosinophil Absolute 06/24/2024 0.07  0.00 - 0.70 x10(3) uL Final    Basophil Absolute 06/24/2024 0.03  0.00 - 0.20 x10(3) uL Final    Immature Granulocyte Absolute 06/24/2024 0.02  0.00 - 1.00 x10(3) uL Final    Neutrophil % 06/24/2024 65.0  % Final    Lymphocyte % 06/24/2024 26.3  % Final    Monocyte % 06/24/2024 6.8  % Final    Eosinophil % 06/24/2024 1.1  % Final    Basophil % 06/24/2024 0.5  % Final    Immature Granulocyte % 06/24/2024 0.3  % Final    Vitamin D, 25OH, Total 06/24/2024 42.1  30.0 - 100.0 ng/mL Final    Literature  Recommendations for 25(OH)D levels are:  Range           Vitamin D Status   <20    ng/mL      Deficiency   20-<30 ng/mL      Insufficiency    ng/mL      Sufficiency   >100   ng/mL      Toxicity    *Clinical controversy exists regarding optimal 25(OH)D levels. Emerging evidence links potential adverse effects to high levels, particularly >60 ng/mL.           ASSESSMENT   Imp: High risk screening colonoscopy history of hemorrhoid  PLan: Colonoscopy discussed with patient risk of bleeding a bowel perforation with surgery to repair      Meds & Refills for this Visit:  Requested Prescriptions      No prescriptions requested or ordered in this encounter       Imaging & Consults:  Dirk Matt Herrera is a 68 year old male  Chief Complaint   Patient presents with    New Patient     NP- Internal Hemorrhoids- states have the hemorrhoids for the last few years, constipation at times

## 2024-10-07 RX ORDER — ATORVASTATIN CALCIUM 10 MG/1
10 TABLET, FILM COATED ORAL NIGHTLY
Qty: 90 TABLET | Refills: 3 | Status: SHIPPED | OUTPATIENT
Start: 2024-10-07

## 2024-10-31 ENCOUNTER — NURSE ONLY (OUTPATIENT)
Dept: FAMILY MEDICINE CLINIC | Facility: CLINIC | Age: 69
End: 2024-10-31
Payer: MEDICARE

## 2024-10-31 PROCEDURE — 90662 IIV NO PRSV INCREASED AG IM: CPT | Performed by: FAMILY MEDICINE

## 2024-10-31 PROCEDURE — G0008 ADMIN INFLUENZA VIRUS VAC: HCPCS | Performed by: FAMILY MEDICINE

## 2024-12-03 ENCOUNTER — TELEPHONE (OUTPATIENT)
Facility: LOCATION | Age: 69
End: 2024-12-03

## 2024-12-03 NOTE — TELEPHONE ENCOUNTER
MAYA SHELLEY Patient  Member ID  [id number]    Date of Birth  1955-09-18    Gender  NA    Transaction Type  Outpatient Authorization    Organization  [facility]    Payer  [id number]    [facility] logo     Certificate Information  Reference Number  [id number]    Status  NO ACTION REQUIRED    Message  Requested Service does not require preauthorization. We would strongly encourage you to check benefits for this service.    Member Information  Patient Name  MAYA SHELLEY    Patient Date of Birth  1955-09-18    Member ID  [id number]    Relationship to Subscriber  Self    Subscriber Name  MAYA SHELLEY    Requesting Provider     Name  ROSSY LARSEN    NPI  [id number]    Tax Id  [id number]    Specialty  [id number]    Provider Role  Provider    Address  [address] [place] [address]    Phone  [phone]    Fax  [phone]    Contact Name  RACHEAL NICHOLSON    Service Information  Service Type  2 - Surgical    Place of Service  22 - [facility]    Service From - To Date  2024-12-17 - 2024-12-31    Quantity  1 Visits  Diagnosis Code 1   - Encounter for screening for malignant neoplasm of colon    Procedure Code 1 (CPT/HCPCS)  57761 - DIAGNOSTIC COLONOSCOPY    Quantity  1 Units    Status  NO ACTION REQUIRED

## 2024-12-12 ENCOUNTER — PATIENT MESSAGE (OUTPATIENT)
Facility: LOCATION | Age: 69
End: 2024-12-12

## 2024-12-12 NOTE — TELEPHONE ENCOUNTER
Spoke with Dr. Fred gomez to proceed with colonoscopy on 12/17 after taking 81mg Aspirin on 12/12. Informed patient of this.

## 2024-12-17 ENCOUNTER — HOSPITAL ENCOUNTER (OUTPATIENT)
Facility: HOSPITAL | Age: 69
Setting detail: HOSPITAL OUTPATIENT SURGERY
Discharge: HOME | End: 2024-12-17
Attending: SURGERY | Admitting: SURGERY
Payer: MEDICARE

## 2024-12-17 ENCOUNTER — ANESTHESIA EVENT (OUTPATIENT)
Dept: ENDOSCOPY | Facility: HOSPITAL | Age: 69
End: 2024-12-17
Payer: MEDICARE

## 2024-12-17 ENCOUNTER — ANESTHESIA (OUTPATIENT)
Dept: ENDOSCOPY | Facility: HOSPITAL | Age: 69
End: 2024-12-17
Payer: MEDICARE

## 2024-12-17 VITALS
DIASTOLIC BLOOD PRESSURE: 60 MMHG | TEMPERATURE: 98 F | HEART RATE: 65 BPM | BODY MASS INDEX: 25.9 KG/M2 | OXYGEN SATURATION: 99 % | HEIGHT: 71 IN | WEIGHT: 185 LBS | SYSTOLIC BLOOD PRESSURE: 108 MMHG | RESPIRATION RATE: 16 BRPM

## 2024-12-17 DIAGNOSIS — Z12.11 SCREEN FOR COLON CANCER: ICD-10-CM

## 2024-12-17 PROCEDURE — 88305 TISSUE EXAM BY PATHOLOGIST: CPT | Performed by: SURGERY

## 2024-12-17 PROCEDURE — 0DBK8ZX EXCISION OF ASCENDING COLON, VIA NATURAL OR ARTIFICIAL OPENING ENDOSCOPIC, DIAGNOSTIC: ICD-10-PCS | Performed by: SURGERY

## 2024-12-17 RX ORDER — SODIUM CHLORIDE, SODIUM LACTATE, POTASSIUM CHLORIDE, CALCIUM CHLORIDE 600; 310; 30; 20 MG/100ML; MG/100ML; MG/100ML; MG/100ML
INJECTION, SOLUTION INTRAVENOUS CONTINUOUS
Status: DISCONTINUED | OUTPATIENT
Start: 2024-12-17 | End: 2024-12-17

## 2024-12-17 RX ORDER — HYDROMORPHONE HYDROCHLORIDE 1 MG/ML
0.2 INJECTION, SOLUTION INTRAMUSCULAR; INTRAVENOUS; SUBCUTANEOUS EVERY 5 MIN PRN
Status: DISCONTINUED | OUTPATIENT
Start: 2024-12-17 | End: 2024-12-17

## 2024-12-17 RX ORDER — LIDOCAINE HYDROCHLORIDE 10 MG/ML
INJECTION, SOLUTION EPIDURAL; INFILTRATION; INTRACAUDAL; PERINEURAL AS NEEDED
Status: DISCONTINUED | OUTPATIENT
Start: 2024-12-17 | End: 2024-12-17 | Stop reason: SURG

## 2024-12-17 RX ORDER — HYDROMORPHONE HYDROCHLORIDE 1 MG/ML
0.6 INJECTION, SOLUTION INTRAMUSCULAR; INTRAVENOUS; SUBCUTANEOUS EVERY 5 MIN PRN
Status: DISCONTINUED | OUTPATIENT
Start: 2024-12-17 | End: 2024-12-17

## 2024-12-17 RX ORDER — NALOXONE HYDROCHLORIDE 0.4 MG/ML
0.08 INJECTION, SOLUTION INTRAMUSCULAR; INTRAVENOUS; SUBCUTANEOUS AS NEEDED
Status: DISCONTINUED | OUTPATIENT
Start: 2024-12-17 | End: 2024-12-17

## 2024-12-17 RX ORDER — METOCLOPRAMIDE HYDROCHLORIDE 5 MG/ML
10 INJECTION INTRAMUSCULAR; INTRAVENOUS EVERY 8 HOURS PRN
Status: DISCONTINUED | OUTPATIENT
Start: 2024-12-17 | End: 2024-12-17

## 2024-12-17 RX ORDER — HYDROMORPHONE HYDROCHLORIDE 1 MG/ML
0.4 INJECTION, SOLUTION INTRAMUSCULAR; INTRAVENOUS; SUBCUTANEOUS EVERY 5 MIN PRN
Status: DISCONTINUED | OUTPATIENT
Start: 2024-12-17 | End: 2024-12-17

## 2024-12-17 RX ORDER — ONDANSETRON 2 MG/ML
4 INJECTION INTRAMUSCULAR; INTRAVENOUS EVERY 6 HOURS PRN
Status: DISCONTINUED | OUTPATIENT
Start: 2024-12-17 | End: 2024-12-17

## 2024-12-17 RX ADMIN — SODIUM CHLORIDE, SODIUM LACTATE, POTASSIUM CHLORIDE, CALCIUM CHLORIDE: 600; 310; 30; 20 INJECTION, SOLUTION INTRAVENOUS at 09:34:00

## 2024-12-17 RX ADMIN — LIDOCAINE HYDROCHLORIDE 25 MG: 10 INJECTION, SOLUTION EPIDURAL; INFILTRATION; INTRACAUDAL; PERINEURAL at 10:08:00

## 2024-12-17 NOTE — H&P
Patient presents with request for evaluation regarding hemorrhoids.  Patient states approximately 5 years ago he was traveling and became constipated and had rectal bleeding.  This lasted for a few weeks and then resolved.  Patient states he does not reduce any tissue back into his rectum spontaneously or manually after a bowel movement.  He states that he has minor burning and itching in the area.  He states that when he wipes he sees just a small amount of bright red blood with excessive wiping.  He does not use any salves or ointments or Preparation H in the area.  He is uncertain whether he should undergo hemorrhoidectomy.  He states his last colonoscopy was 8 years ago he states that colonoscopy was normal however he has had colon polyps on prior colonoscopies         .               Past Medical History       Past Medical History:    Allergies    Cancer (HCC)    High cholesterol    Visual impairment         Past Surgical History         Past Surgical History:   Procedure Laterality Date    Hernia surgery         umbilical hernia repair    Laparoscopy, surgical prostatectomy, retropubic radical, w/nerve sparing             Medications Ordered Prior to Encounter          Current Outpatient Medications on File Prior to Visit   Medication Sig Dispense Refill    Scopolamine 1.5mg TD patch 1mg/3days Place 1 patch onto the skin every third day. Start patch 12 hours prior to embarking. 4 patch 0    allopurinol 300 MG Oral Tab Take 0.5 tablets (150 mg total) by mouth daily. 45 tablet 1    atorvastatin 10 MG Oral Tab Take 1 tablet (10 mg total) by mouth nightly. 90 tablet 0    aspirin 81 MG Oral Tab EC Take 1 tablet (81 mg total) by mouth daily.        cholecalciferol 125 MCG (5000 UT) Oral Cap Take 1 capsule (5,000 Units total) by mouth daily.        Multiple Vitamins-Minerals (OCUVITE EYE HEALTH FORMULA) Oral Cap Take 1 capsule by mouth daily.        loratadine 10 MG Oral Tab Take 1 tablet (10 mg total) by mouth  daily.        fluticasone propionate 50 MCG/ACT Nasal Suspension by Each Nare route daily.        PAPAVERINE HCL IJ Inject as directed. FOR ED          No current facility-administered medications on file prior to visit.         @ALL@  Family History   No family history on file.     Short Social Hx on File   Social History           Socioeconomic History    Marital status:    Tobacco Use    Smoking status: Never    Smokeless tobacco: Never   Vaping Use    Vaping status: Never Used   Substance and Sexual Activity    Alcohol use: Yes    Drug use: Never      Social Determinants of Health           Food Insecurity: Unknown (6/18/2024)     Received from [facility] Vital Sign      Worried About Running Out of Food in the Last Year: Never true            ROS      GENERAL HEALTH: otherwise feels well, no weight loss, no fever or chills  SKIN: denies any unusual skin rashes or jaundice  HEENT: denies nasal congestion, sinus pain or sore throat; hearing loss negative,   EYES , no diplopia or vision changes  RESPIRATORY: denies shortness of breath, wheezing or cough   CARDIOVASCULAR: denies chest pain or ORTEGA; no palpitations   GI: denies nausea, vomiting, constipation, diarrhea; no rectal bleeding; no heartburn  GENITAL/: no dysuria, urgency or frequency, no tea colored urine  MUSCULOSKELETAL: no joint complaints upper or lower extremities  HEMATOLOGY: denies hx anemia; denies bruising or excessive bleeding  Endocrine: no weight gain or loss no hot or cold intolerance     EXAM      Pulse 72, temperature 98.1 °F (36.7 °C), temperature source Temporal.  GENERAL: well developed, well nourished male, in no apparent distress.    MENTAL STATUS :Alert, oriented x 3  PSYCH: normal mood and affect  SKIN: anicteric, no rashes, no bruising  EYES: PERRLA, EOMI, sclera anicteric,  conjunctiva without pallor  HEENT: normocephalic, atraumatic, TMs clear, nares patent, mouth moist, pharynx w/o erythema  NECK: supple,  no JVD, no adenopathy, no thyromegaly  RESPIRATORY: clear to auscultation, no rales , rhonchi or wheezing  CARDIOVASCULAR: RRR, murmur negative  ABDOMEN: normal active BS, soft, nondistended  no HSM, no masses or hernias  LYMPHATIC: no axillary , supraclavicular or inguinal lymphadenopathy  EXTREMITIES: no cyanosis, clubbing or edema, no atrophy, full ROM  Rectal examination demonstrates normal anal Derm no evidence of prolapsing hemorrhoids no significant external hemorrhoidal disease  STUDIES:              [facility] Lab Encounter on 06/24/2024   Component Date Value Ref Range Status    Cholesterol, Total 06/24/2024 146  <200 mg/dL Final     Desirable  <200 mg/dL  Borderline  200-239 mg/dL  High      >=240 mg/dL          HDL Cholesterol 06/24/2024 45  40 - 59 mg/dL Final     Interpretive Information:   An HDL cholesterol <40 mg/dL is low and constitutes a coronary heart disease risk factor. An HDL cholesterol >60 mg/dL is a negative risk factor for coronary heart disease.          Triglycerides 06/24/2024 151 (H)  30 - 149 mg/dL Final     Reference interval for fasting triglycerides  Desirable: <150 mg/dL  Borderline: 150-199 mg/dL  High: 200-499 mg/dL  Very High: >=500 mg/dL             LDL Cholesterol 06/24/2024 75  <100 mg/dL Final     Desirable <100 mg/dL   Borderline 100-129 mg/dL   High     >=130mg/dL          VLDL 06/24/2024 23  0 - 30 mg/dL Final    Non HDL Chol 06/24/2024 101  <130 mg/dL Final     Desirable  <130 mg/dL   Borderline  130-159 mg/dL   High        160-189 mg/dL       Very high >=190 mg/dL          Patient Fasting for Lipid? 06/24/2024 Yes    Final    Glucose 06/24/2024 103 (H)  70 - 99 mg/dL Final    Sodium 06/24/2024 139  136 - 145 mmol/L Final    Potassium 06/24/2024 4.2  3.5 - 5.1 mmol/L Final    Chloride 06/24/2024 108  98 - 112 mmol/L Final    CO2 06/24/2024 26.0  21.0 - 32.0 mmol/L Final    Anion Gap 06/24/2024 5  0 - 18 mmol/L Final    BUN 06/24/2024 18  9 - 23 mg/dL Final    Creatinine  06/24/2024 0.99  0.70 - 1.30 mg/dL Final    Calcium, Total 06/24/2024 9.1  8.5 - 10.1 mg/dL Final    Calculated Osmolality 06/24/2024 290  275 - 295 mOsm/kg Final    eGFR-Cr 06/24/2024 83  >=60 mL/min/1.73m2 Final    AST 06/24/2024 15  15 - 37 U/L Final    ALT 06/24/2024 49  16 - 61 U/L Final    Alkaline Phosphatase 06/24/2024 51  45 - 117 U/L Final    Bilirubin, Total 06/24/2024 0.9  0.1 - 2.0 mg/dL Final    Total Protein 06/24/2024 7.5  6.4 - 8.2 g/dL Final    Albumin 06/24/2024 4.0  3.4 - 5.0 g/dL Final    Globulin  06/24/2024 3.5  2.8 - 4.4 g/dL Final    A/G Ratio 06/24/2024 1.1  1.0 - 2.0 Final    Patient Fasting for CMP? 06/24/2024 Yes    Final    Uric Acid 06/24/2024 5.6  3.5 - 7.2 mg/dL Final    WBC 06/24/2024 6.3  4.0 - 11.0 x10(3) uL Final    RBC 06/24/2024 4.84  3.80 - 5.80 x10(6)uL Final    HGB 06/24/2024 15.2  13.0 - 17.5 g/dL Final    HCT 06/24/2024 45.1  39.0 - 53.0 % Final    PLT 06/24/2024 226.0  150.0 - 450.0 10(3)uL Final    MCV 06/24/2024 93.2  80.0 - 100.0 fL Final    MCH 06/24/2024 31.4  26.0 - 34.0 pg Final    MCHC 06/24/2024 33.7  31.0 - 37.0 g/dL Final    RDW 06/24/2024 12.6  % Final    Neutrophil Absolute Prelim 06/24/2024 4.11  1.50 - 7.70 x10 (3) uL Final    Neutrophil Absolute 06/24/2024 4.11  1.50 - 7.70 x10(3) uL Final    Lymphocyte Absolute 06/24/2024 1.66  1.00 - 4.00 x10(3) uL Final    Monocyte Absolute 06/24/2024 0.43  0.10 - 1.00 x10(3) uL Final    Eosinophil Absolute 06/24/2024 0.07  0.00 - 0.70 x10(3) uL Final    Basophil Absolute 06/24/2024 0.03  0.00 - 0.20 x10(3) uL Final    Immature Granulocyte Absolute 06/24/2024 0.02  0.00 - 1.00 x10(3) uL Final    Neutrophil % 06/24/2024 65.0  % Final    Lymphocyte % 06/24/2024 26.3  % Final    Monocyte % 06/24/2024 6.8  % Final    Eosinophil % 06/24/2024 1.1  % Final    Basophil % 06/24/2024 0.5  % Final    Immature Granulocyte % 06/24/2024 0.3  % Final    Vitamin D, 25OH, Total 06/24/2024 42.1  30.0 - 100.0 ng/mL Final     Literature  Recommendations for 25(OH)D levels are:  Range           Vitamin D Status   <20    ng/mL      Deficiency   20-<30 ng/mL      Insufficiency    ng/mL      Sufficiency   >100   ng/mL      Toxicity     *Clinical controversy exists regarding optimal 25(OH)D levels. Emerging evidence links potential adverse effects to high levels, particularly >60 ng/mL.               ASSESSMENT   Imp: High risk screening colonoscopy history of hemorrhoid  PLan: Colonoscopy discussed with patient risk of bleeding a bowel perforation with surgery to repair        Meds & Refills for this Visit:

## 2024-12-17 NOTE — BRIEF OP NOTE
Pre-Operative Diagnosis: Screen for colon cancer [Z12.11]     Post-Operative Diagnosis: POLYP,      Procedure Performed:   COLONOSCOPY WITH FORCEP POLYPECTOMY    Surgeons and Role:     * Cl Ibarra DO - Primary    Assistant(s):        Surgical Findings:        Specimen:        Estimated Blood Loss: No data recorded    Dictation Number:        Cl Ibarra DO  12/17/2024  10:32 AM

## 2024-12-17 NOTE — ANESTHESIA PREPROCEDURE EVALUATION
PRE-OP EVALUATION    Patient Name: Duncan Herrear    Admit Diagnosis: Screen for colon cancer [Z12.11]    Pre-op Diagnosis: Screen for colon cancer [Z12.11]    COLONOSCOPY    Anesthesia Procedure: COLONOSCOPY    Surgeons and Role:     * Cl Ibarra, DO - Primary    Pre-op vitals reviewed.        Body mass index is 25.8 kg/m².    Current medications reviewed.  Hospital Medications:   lactated ringers infusion   Intravenous Continuous       Outpatient Medications:   Prescriptions Prior to Admission[1]    Allergies: Latex      Anesthesia Evaluation    Patient summary reviewed.    Anesthetic Complications           GI/Hepatic/Renal                                 Cardiovascular                     (+) hyperlipidemia                                  Endo/Other                                  Pulmonary                           Neuro/Psych                                      Past Surgical History:   Procedure Laterality Date    Hernia surgery      umbilical hernia repair    Laparoscopy, surgical prostatectomy, retropubic radical, w/nerve sparing       Social History     Socioeconomic History    Marital status:    Tobacco Use    Smoking status: Never    Smokeless tobacco: Never   Vaping Use    Vaping status: Never Used   Substance and Sexual Activity    Alcohol use: Yes     Comment: daily    Drug use: Never     History   Drug Use Unknown     Available pre-op labs reviewed.               Airway      Mallampati: II  Mouth opening: 3 FB  TM distance: 4 - 6 cm  Neck ROM: full Cardiovascular    Cardiovascular exam normal.  Rhythm: regular  Rate: normal     Dental             Pulmonary    Pulmonary exam normal.                 Other findings              ASA: 2   Plan: MAC  NPO status verified and patient meets guidelines.          Plan/risks discussed with: patient and significant other                Present on Admission:  **None**             [1]   Medications Prior to Admission   Medication Sig Dispense  Refill Last Dose/Taking    ATORVASTATIN 10 MG Oral Tab TAKE 1 TABLET NIGHTLY 90 tablet 3 Taking    allopurinol 300 MG Oral Tab Take 0.5 tablets (150 mg total) by mouth daily. 45 tablet 1 Taking    aspirin 81 MG Oral Tab EC Take 1 tablet (81 mg total) by mouth daily.   Taking    cholecalciferol 125 MCG (5000 UT) Oral Cap Take 1 capsule (5,000 Units total) by mouth daily.   Taking    Multiple Vitamins-Minerals (OCUVITE EYE HEALTH FORMULA) Oral Cap Take 1 capsule by mouth daily.   Taking    loratadine 10 MG Oral Tab Take 1 tablet (10 mg total) by mouth daily.   Taking    fluticasone propionate 50 MCG/ACT Nasal Suspension by Each Nare route daily.   Taking    PEG 3350-KCl-Na Bicarb-NaCl (TRILYTE) 420 g Oral Recon Soln Starting at 4:00 pm the night before procedure, drink 8 ounces of the prep every 15-20 minutes until finished 1 each 0     Scopolamine 1.5mg TD patch 1mg/3days Place 1 patch onto the skin every third day. Start patch 12 hours prior to embarking. 4 patch 0     PAPAVERINE HCL IJ Inject as directed. FOR ED

## 2024-12-17 NOTE — ANESTHESIA POSTPROCEDURE EVALUATION
[facility]    Duncan Herrera Patient Status:  Hospital Outpatient Surgery   Age/Gender 69 year old male MRN [id number]   Location [facility] ENDOSCOPY PAIN [facility] Attending Cl Ibarra DO   Hosp Day # 0 PCP Agusto Howell MD       Anesthesia Post-op Note    COLONOSCOPY WITH FORCEP POLYPECTOMY    Procedure Summary       Date: 12/17/24 Room / Location:  ENDOSCOPY 04 / [facility] ENDOSCOPY    Anesthesia Start: 1007 Anesthesia Stop: 1033    Procedure: COLONOSCOPY WITH FORCEP POLYPECTOMY Diagnosis:       Screen for colon cancer      (POLYP,)    Surgeons: Cl Ibarra DO Anesthesiologist: Kwan Curiel MD    Anesthesia Type: MAC ASA Status: 2            Anesthesia Type: MAC    Vitals Value Taken Time   BP 83/45 12/17/24 1033   Temp 98.3 °F (36.8 °C) 12/17/24 1033   Pulse 67 12/17/24 1033   Resp 16 12/17/24 1033   SpO2 93 % 12/17/24 1033       Patient Location: Endoscopy    Anesthesia Type: MAC    Airway Patency: patent    Postop Pain Control: adequate    Mental Status: mildly sedated but able to meaningfully participate in the post-anesthesia evaluation    Nausea/Vomiting: none    Cardiopulmonary/Hydration status: stable euvolemic    Complications: no apparent anesthesia related complications    Postop vital signs: stable    Dental Exam: Unchanged from Preop    Patient to be discharged home when criteria met.

## 2024-12-17 NOTE — DISCHARGE INSTRUCTIONS
Home Care Instructions for Colonoscopy With Sedation    Diet:  - Resume your regular diet as tolerated unless otherwise instructed.  - start with light meals to minimize bloating.  - Do not drink alcohol today.    Medication:  - If you have questions about resuming your normal medications, please contact your Primary Care Physician.    Activities:  - Take it easy today. Do not return to work today.  - Do not drive today.  - Do not operate any machinery today (including kitchen equipment).    Colonoscopy:  - You may notice some rectal \"spotting\" (a little blood on the toilet tissue) for a day or two after the exam. This is normal.  - If you experience any rectal bleeding (not spotting), persistent tenderness or sharp severe abdominal pains, oral temperature over 100 degrees Farenheit, light-headedness or dizziness, or any other problems, contact your doctor.      **If unable to reach your doctor, please go to the [facility] Emergency Room**    - Your referring physician will receive a full report of your examination.  - If you do not hear from your doctor's office within two weeks of your biopsy, please call them for your results.

## 2024-12-18 NOTE — OPERATIVE REPORT
[facility]    PATIENT'S NAME: MAYA SHELLEY   ATTENDING PHYSICIAN: Cl Ibarra D.O.   OPERATING PHYSICIAN: Cl Ibarra D.O.   PATIENT ACCOUNT#:   [id number]    LOCATION:  [address]  MEDICAL RECORD #:   [id number]       YOB: 1955  ADMISSION DATE:       12/17/2024      OPERATION DATE:  12/17/2024    OPERATIVE REPORT      PREOPERATIVE DIAGNOSIS:  Screening colonoscopy, high risk, based on personal history.  POSTOPERATIVE DIAGNOSIS:  Colon polyp of the ascending colon, sigmoid diverticulosis.  PROCEDURE:  Pancolonoscopy to cecum with forceps polypectomy, ascending colon.    ANESTHESIA:  MAC.      PREPARATION:  [place] scale 3 ascending, 3 transverse, 3 descending.    REPEAT COLONOSCOPY:  Five years.    OPERATIVE TECHNIQUE:  An informed consent was previously obtained.  The patient was taken to the endoscopy suite and placed in the left lateral decubitus position.  Digital rectal examination was carried out.  Olympus colonoscope advanced into the rectal ampulla.  The scope was traversed through rectum, sigmoid, descending, transverse, and ascending colons under direct visualization of the lumen.  The base of the cecum was visualized.  Scope was slowly withdrawn through the ascending colon where a 6 mm sessile polyp was identified, forceps polypectomied and retrieved.  The scope was then slowly withdrawn through the transverse, descending, sigmoid colon and rectum demonstrating no evidence of mucosal disease, masses, polyps, or other lesions with exception of sigmoid diverticulosis.    The scope was then withdrawn.  Patient tolerated the procedure well.     Dictated By Cl Ibarra D.O.  d: 12/17/2024 10:33:34  t: 12/17/2024 19:12:41  [facility] [id number]  /

## 2024-12-23 ENCOUNTER — TELEPHONE (OUTPATIENT)
Facility: LOCATION | Age: 69
End: 2024-12-23

## 2025-01-11 DIAGNOSIS — M1A.9XX0 CHRONIC GOUT WITHOUT TOPHUS, UNSPECIFIED CAUSE, UNSPECIFIED SITE: ICD-10-CM

## 2025-01-13 RX ORDER — ALLOPURINOL 300 MG/1
150 TABLET ORAL DAILY
Qty: 45 TABLET | Refills: 3 | Status: SHIPPED | OUTPATIENT
Start: 2025-01-13

## 2025-02-25 ENCOUNTER — OFFICE VISIT (OUTPATIENT)
Dept: OTOLARYNGOLOGY | Facility: CLINIC | Age: 70
End: 2025-02-25
Payer: MEDICARE

## 2025-02-25 VITALS — HEIGHT: 71 IN | BODY MASS INDEX: 26 KG/M2

## 2025-02-25 DIAGNOSIS — H61.23 BILATERAL IMPACTED CERUMEN: Primary | ICD-10-CM

## 2025-02-25 PROCEDURE — 69210 REMOVE IMPACTED EAR WAX UNI: CPT | Performed by: STUDENT IN AN ORGANIZED HEALTH CARE EDUCATION/TRAINING PROGRAM

## 2025-02-25 NOTE — PROGRESS NOTES
Duncan Herrera is a 69 year old male.   Chief Complaint   Patient presents with    New Patient    Ear Wax     Patient here for bilateral ear wax cleaning.     HPI:   69-year-old with a clogged sensation in his ears worse on the left side    Current Outpatient Medications   Medication Sig Dispense Refill    ALLOPURINOL 300 MG Oral Tab TAKE ONE-HALF (1/2) TABLET DAILY 45 tablet 3    ATORVASTATIN 10 MG Oral Tab TAKE 1 TABLET NIGHTLY 90 tablet 3    PEG 3350-KCl-Na Bicarb-NaCl (TRILYTE) 420 g Oral Recon Soln Starting at 4:00 pm the night before procedure, drink 8 ounces of the prep every 15-20 minutes until finished 1 each 0    Scopolamine 1.5mg TD patch 1mg/3days Place 1 patch onto the skin every third day. Start patch 12 hours prior to embarking. 4 patch 0    aspirin 81 MG Oral Tab EC Take 1 tablet (81 mg total) by mouth daily.      cholecalciferol 125 MCG (5000 UT) Oral Cap Take 1 capsule (5,000 Units total) by mouth daily.      Multiple Vitamins-Minerals (OCUVITE EYE HEALTH FORMULA) Oral Cap Take 1 capsule by mouth daily.      loratadine 10 MG Oral Tab Take 1 tablet (10 mg total) by mouth daily.      fluticasone propionate 50 MCG/ACT Nasal Suspension by Each Nare route daily.      PAPAVERINE HCL IJ Inject as directed. FOR ED        Past Medical History:    Allergies    Cancer (HCC)    High cholesterol    Hx of motion sickness    Visual impairment      Social History:  Social History     Socioeconomic History    Marital status:    Tobacco Use    Smoking status: Never    Smokeless tobacco: Never   Vaping Use    Vaping status: Never Used   Substance and Sexual Activity    Alcohol use: Yes     Comment: daily    Drug use: Never     Social Drivers of Health     Food Insecurity: Unknown (6/18/2024)    Received from [facility] Vital Sign     Worried About Running Out of Food in the Last Year: Never true      Past Surgical History:   Procedure Laterality Date    Colonoscopy N/A 12/17/2024     Procedure: COLONOSCOPY WITH FORCEP POLYPECTOMY;  Surgeon: Cl Ibarra DO;  Location:  ENDOSCOPY    Hernia surgery      umbilical hernia repair    Laparoscopy, surgical prostatectomy, retropubic radical, w/nerve sparing           EXAM:   Ht 5' 11\" (1.803 m)   BMI 25.80 kg/m²     System Details   Skin Inspection - Normal.   Constitutional Overall appearance - Normal.   Head/Face Symmetric, TMJ tenderness not present    Eyes EOMI, PERRL   Right ear:  Canal with cerumen, TM intact, no STEWART   Left ear:  Canal with cerumen, TM intact, no STEWART   Nose: Septum midline, inferior turbinates not enlarged, nasal valves without collapse    Oral cavity/Oropharynx: No lesions or masses on inspection or palpation, tonsils symmetric    Neck: Soft without LAD, thyroid not enlarged  Voice clear/ no stridor   Other:      SCOPES AND PROCEDURES:     Canals:  Left: Canal with cerumen preventing adequate view of TM, debrided with instrumentation  Right: Canal with cerumen preventing adequate view of TM, debrided with instrumentation    Tympanic Membranes:  Left: Normal tympanic membrane.   Right: Normal tympanic membrane.     TM Visualized Method:   Left TM examined via otomicroscopy.    Right TM examined via otomicroscopy.      PROCEDURE:   Removal of cerumen impaction   The cerumen impaction was completely removed on the left and right sides using microscopy as necessary.   Removal was completed by using a curette and suction.     AUDIOGRAM AND IMAGING:         IMPRESSION:   1. Bilateral impacted cerumen       Recommendations:  -Both ears with cerumen they were cleaned otherwise no abnormalities.  Discussed cerumen precautions can see me back as needed    The patient indicates understanding of these issues and agrees to the plan.      Abdiel Sandoval MD  2/25/2025  12:12 PM

## 2025-03-27 ENCOUNTER — OFFICE VISIT (OUTPATIENT)
Dept: SURGERY | Facility: CLINIC | Age: 70
End: 2025-03-27
Payer: MEDICARE

## 2025-03-27 DIAGNOSIS — R82.90 URINE FINDING: ICD-10-CM

## 2025-03-27 DIAGNOSIS — C61 PROSTATE CANCER (HCC): Primary | ICD-10-CM

## 2025-03-27 LAB
APPEARANCE: CLEAR
BILIRUBIN: NEGATIVE
GLUCOSE (URINE DIPSTICK): NEGATIVE MG/DL
KETONES (URINE DIPSTICK): NEGATIVE MG/DL
LEUKOCYTES: NEGATIVE
MULTISTIX LOT#: NORMAL NUMERIC
NITRITE, URINE: NEGATIVE
OCCULT BLOOD: NEGATIVE
PH, URINE: 6 (ref 4.5–8)
PROTEIN (URINE DIPSTICK): NEGATIVE MG/DL
SPECIFIC GRAVITY: 1 (ref 1–1.03)
URINE-COLOR: YELLOW
UROBILINOGEN,SEMI-QN: 0.2 MG/DL (ref 0–1.9)

## 2025-03-27 PROCEDURE — 81002 URINALYSIS NONAUTO W/O SCOPE: CPT | Performed by: SURGERY

## 2025-03-27 PROCEDURE — 99214 OFFICE O/P EST MOD 30 MIN: CPT | Performed by: SURGERY

## 2025-03-27 PROCEDURE — G2211 COMPLEX E/M VISIT ADD ON: HCPCS | Performed by: SURGERY

## 2025-03-27 RX ORDER — SILDENAFIL 100 MG/1
100 TABLET, FILM COATED ORAL
Qty: 30 TABLET | Refills: 11 | Status: SHIPPED | OUTPATIENT
Start: 2025-03-27

## 2025-03-27 NOTE — PROGRESS NOTES
Urology Clinic Note    Primary Care Provider:  Agusto Howell MD     Chief Complaint:   Prostate cancer    HPI & Assessment:   Duncan Herrera is a 69 year old male with history of GERD, gout, hyperlipidemia, Jefferson 7 prostate cancer status post robot-assisted laparoscopic radical prostatectomy in June 2011.  Margins were negative.  Initial PSA was 5.2, and since surgery PSA has remained undetectable, last on 3/27/2023.  He is transferring his urologic care to [facility] since he recently moved from [place] given grandchildren in the area.  He had an umbilical hernia and recently underwent successful umbilical hernia repair.       He has very minimal stress incontinence and continues regular Kegel exercises.  He uses intracavernosal injections periodically for erectile dysfunction that works well for him (~8 mL of papaverine/phentolamine/PGE1 (TRIMIX) 15mg/0.5mg/5mcg).     At his last visit 1 year ago I transitioned his Trimix order to [facility] Trimix # 5.  He has not really been using this.    PSA undetectable on 3/25/2024.    Doing well at this time.  No bothersome urinary symptoms or stress incontinence.  Trimix injections working well for him, but interested in trying Viagra since he has not tried pills in a long time.    AUA symptom score is 4/1 with LUTS.    Urinalysis: Negative    Plan:   -Trial Viagra 100 mg as needed  -Repeat PSA in June to synchronize with yearly PCP labs  -Return to clinic in July for symptom check  -If things are stable at next visit can have yearly PSA checks with PCP       PSA:  Lab Results   Component Value Date    PSA <0.01 03/25/2024    PSA <0.01 03/27/2023        History:     Past Medical History:    Allergies    Cancer (HCC)    Esophageal reflux    Gout    High cholesterol    Hx of motion sickness    Prostate cancer (HCC)    Visual impairment       Past Surgical History:   Procedure Laterality Date    Colonoscopy N/A 12/17/2024    Procedure: COLONOSCOPY WITH FORCEP  POLYPECTOMY;  Surgeon: Cl Ibarra DO;  Location:  ENDOSCOPY    Hernia surgery      umbilical hernia repair    Laparoscopy, surgical prostatectomy, retropubic radical, w/nerve sparing      Other surgical history  2011    Vasectomy         Family History   Problem Relation Age of Onset    Prostate Cancer Father     Prostate Cancer Paternal Grandfather        Social History     Socioeconomic History    Marital status:    Tobacco Use    Smoking status: Never    Smokeless tobacco: Never   Vaping Use    Vaping status: Never Used   Substance and Sexual Activity    Alcohol use: Yes     Alcohol/week: 11.0 standard drinks of alcohol     Types: 5 Glasses of wine, 6 Standard drinks or equivalent per week     Comment: daily    Drug use: Never     Social Drivers of Health     Food Insecurity: Unknown (6/18/2024)    Received from [facility] Vital Sign     Worried About Running Out of Food in the Last Year: Never true       Medications (Active prior to today's visit):  Current Outpatient Medications   Medication Sig Dispense Refill    ALLOPURINOL 300 MG Oral Tab TAKE ONE-HALF (1/2) TABLET DAILY 45 tablet 3    ATORVASTATIN 10 MG Oral Tab TAKE 1 TABLET NIGHTLY 90 tablet 3    PEG 3350-KCl-Na Bicarb-NaCl (TRILYTE) 420 g Oral Recon Soln Starting at 4:00 pm the night before procedure, drink 8 ounces of the prep every 15-20 minutes until finished 1 each 0    Scopolamine 1.5mg TD patch 1mg/3days Place 1 patch onto the skin every third day. Start patch 12 hours prior to embarking. 4 patch 0    aspirin 81 MG Oral Tab EC Take 1 tablet (81 mg total) by mouth daily.      cholecalciferol 125 MCG (5000 UT) Oral Cap Take 1 capsule (5,000 Units total) by mouth daily.      Multiple Vitamins-Minerals (OCUVITE EYE HEALTH FORMULA) Oral Cap Take 1 capsule by mouth daily.      loratadine 10 MG Oral Tab Take 1 tablet (10 mg total) by mouth daily.      fluticasone propionate 50 MCG/ACT Nasal Suspension by Each Nare route daily.       PAPAVERINE HCL IJ Inject as directed. FOR ED         Allergies:  Allergies[1]    Review of Systems:   A comprehensive 10-point review of systems was completed.  Pertinent positives and negatives are noted in the the HPI.    Physical Exam:   CONSTITUTIONAL: Well developed, well nourished, in no acute distress  NEUROLOGIC: Alert and oriented  HEAD: Normocephalic, atraumatic  EYES: Sclera non-icteric  ENT: Hearing intact, moist mucous membranes  NECK: No obvious goiter or masses  RESPIRATORY: Normal respiratory effort  SKIN: No evident rashes  ABDOMEN: Soft, non-tender, non-distended      In total, 30 minutes were spent on this patient encounter (including chart review, patient history, physical, counseling, documentation, and communication).    Derick Leon MD  Staff Urologist  [facility]  Office: [phone]           [1]   Allergies  Allergen Reactions    Latex RASH

## 2025-06-17 ENCOUNTER — LAB ENCOUNTER (OUTPATIENT)
Dept: LAB | Age: 70
End: 2025-06-17
Attending: FAMILY MEDICINE
Payer: MEDICARE

## 2025-06-17 ENCOUNTER — OFFICE VISIT (OUTPATIENT)
Dept: FAMILY MEDICINE CLINIC | Facility: CLINIC | Age: 70
End: 2025-06-17
Payer: MEDICARE

## 2025-06-17 VITALS
HEART RATE: 66 BPM | OXYGEN SATURATION: 96 % | RESPIRATION RATE: 15 BRPM | HEIGHT: 71 IN | BODY MASS INDEX: 27.44 KG/M2 | WEIGHT: 196 LBS | DIASTOLIC BLOOD PRESSURE: 60 MMHG | SYSTOLIC BLOOD PRESSURE: 110 MMHG

## 2025-06-17 DIAGNOSIS — Z13.6 SCREENING FOR CARDIOVASCULAR CONDITION: ICD-10-CM

## 2025-06-17 DIAGNOSIS — Z13.0 SCREENING FOR DEFICIENCY ANEMIA: ICD-10-CM

## 2025-06-17 DIAGNOSIS — M10.9 GOUT, UNSPECIFIED CAUSE, UNSPECIFIED CHRONICITY, UNSPECIFIED SITE: ICD-10-CM

## 2025-06-17 DIAGNOSIS — E78.5 HYPERLIPIDEMIA LDL GOAL <100: ICD-10-CM

## 2025-06-17 DIAGNOSIS — Z00.00 ENCOUNTER FOR ANNUAL HEALTH EXAMINATION: Primary | ICD-10-CM

## 2025-06-17 DIAGNOSIS — Z00.00 ENCOUNTER FOR ANNUAL HEALTH EXAMINATION: ICD-10-CM

## 2025-06-17 DIAGNOSIS — C61 PROSTATE CANCER (HCC): ICD-10-CM

## 2025-06-17 PROBLEM — M79.601 RIGHT ARM PAIN: Status: ACTIVE | Noted: 2020-09-23

## 2025-06-17 LAB
ALBUMIN SERPL-MCNC: 4.7 G/DL (ref 3.2–4.8)
ALBUMIN/GLOB SERPL: 1.7 {RATIO} (ref 1–2)
ALP LIVER SERPL-CCNC: 45 U/L (ref 45–117)
ALT SERPL-CCNC: 42 U/L (ref 10–49)
ANION GAP SERPL CALC-SCNC: 14 MMOL/L (ref 0–18)
AST SERPL-CCNC: 24 U/L (ref ?–34)
BASOPHILS # BLD AUTO: 0.03 X10(3) UL (ref 0–0.2)
BASOPHILS NFR BLD AUTO: 0.4 %
BILIRUB SERPL-MCNC: 0.9 MG/DL (ref 0.2–1.1)
BUN BLD-MCNC: 15 MG/DL (ref 9–23)
CALCIUM BLD-MCNC: 9.3 MG/DL (ref 8.7–10.6)
CHLORIDE SERPL-SCNC: 106 MMOL/L (ref 98–112)
CHOLEST SERPL-MCNC: 153 MG/DL (ref ?–200)
CO2 SERPL-SCNC: 22 MMOL/L (ref 21–32)
CREAT BLD-MCNC: 1.11 MG/DL (ref 0.7–1.3)
EGFRCR SERPLBLD CKD-EPI 2021: 72 ML/MIN/1.73M2 (ref 60–?)
EOSINOPHIL # BLD AUTO: 0.12 X10(3) UL (ref 0–0.7)
EOSINOPHIL NFR BLD AUTO: 1.7 %
ERYTHROCYTE [DISTWIDTH] IN BLOOD BY AUTOMATED COUNT: 12.7 %
FASTING PATIENT LIPID ANSWER: YES
FASTING STATUS PATIENT QL REPORTED: YES
GLOBULIN PLAS-MCNC: 2.7 G/DL (ref 2–3.5)
GLUCOSE BLD-MCNC: 91 MG/DL (ref 70–99)
HCT VFR BLD AUTO: 45.9 % (ref 39–53)
HDLC SERPL-MCNC: 41 MG/DL (ref 40–59)
HGB BLD-MCNC: 15.1 G/DL (ref 13–17.5)
IMM GRANULOCYTES # BLD AUTO: 0.02 X10(3) UL (ref 0–1)
IMM GRANULOCYTES NFR BLD: 0.3 %
LDLC SERPL CALC-MCNC: 88 MG/DL (ref ?–100)
LYMPHOCYTES # BLD AUTO: 1.9 X10(3) UL (ref 1–4)
LYMPHOCYTES NFR BLD AUTO: 27.4 %
MCH RBC QN AUTO: 31.1 PG (ref 26–34)
MCHC RBC AUTO-ENTMCNC: 32.9 G/DL (ref 31–37)
MCV RBC AUTO: 94.6 FL (ref 80–100)
MONOCYTES # BLD AUTO: 0.48 X10(3) UL (ref 0.1–1)
MONOCYTES NFR BLD AUTO: 6.9 %
NEUTROPHILS # BLD AUTO: 4.38 X10 (3) UL (ref 1.5–7.7)
NEUTROPHILS # BLD AUTO: 4.38 X10(3) UL (ref 1.5–7.7)
NEUTROPHILS NFR BLD AUTO: 63.3 %
NONHDLC SERPL-MCNC: 112 MG/DL (ref ?–130)
OSMOLALITY SERPL CALC.SUM OF ELEC: 294 MOSM/KG (ref 275–295)
PLATELET # BLD AUTO: 242 10(3)UL (ref 150–450)
POTASSIUM SERPL-SCNC: 4.3 MMOL/L (ref 3.5–5.1)
PROT SERPL-MCNC: 7.4 G/DL (ref 5.7–8.2)
PSA SERPL-MCNC: <0.04 NG/ML (ref ?–4)
RBC # BLD AUTO: 4.85 X10(6)UL (ref 3.8–5.8)
SODIUM SERPL-SCNC: 142 MMOL/L (ref 136–145)
TRIGL SERPL-MCNC: 136 MG/DL (ref 30–149)
URATE SERPL-MCNC: 6.7 MG/DL (ref 3.7–9.2)
VLDLC SERPL CALC-MCNC: 22 MG/DL (ref 0–30)
WBC # BLD AUTO: 6.9 X10(3) UL (ref 4–11)

## 2025-06-17 PROCEDURE — 84153 ASSAY OF PSA TOTAL: CPT

## 2025-06-17 PROCEDURE — 80061 LIPID PANEL: CPT

## 2025-06-17 PROCEDURE — 80053 COMPREHEN METABOLIC PANEL: CPT

## 2025-06-17 PROCEDURE — 85025 COMPLETE CBC W/AUTO DIFF WBC: CPT

## 2025-06-17 PROCEDURE — 36415 COLL VENOUS BLD VENIPUNCTURE: CPT

## 2025-06-17 PROCEDURE — 84550 ASSAY OF BLOOD/URIC ACID: CPT

## 2025-06-17 NOTE — PROGRESS NOTES
PSA undetectable.     Future Appointments  7/10/2025  10:15 AM   Derick Leon MD           [id number]            Nap 4

## 2025-06-17 NOTE — PROGRESS NOTES
Subjective:   Duncan Herrera is a 69 year old male who presents for a MA AHA (Medicare Advantage Annual Health Assessment) and Subsequent Annual Wellness visit (Pt already had Initial Annual Wellness) and scheduled follow up of multiple significant but stable problems.             NO particular concerns or worries.    No major changes in life or health.    His elderly in laws live with them, his father in law passed just before I saw him last year. Mother in law is still living and witht hem    Daughter just got , and sister just got . Hosting a big celebration.      History/Other:   Fall Risk Assessment:   He has been screened for Falls and is low risk.      Cognitive Assessment:   He had a completely normal cognitive assessment - see flowsheet entries     Functional Ability/Status:   Duncan Herrera has some abnormal functions as listed below:  He has Vision problems based on screening of functional status.       Depression Screening (PHQ):  PHQ-2 SCORE: 0  , done 6/16/2025        Advanced Directives:   He does have a Living Will but we do NOT have it on file in Epic.    He does have a POA but we do NOT have it on file in Epic.    Discussed Advance Care Planning with patient (and family/surrogate if present). Standard forms made available to patient in After Visit Summary.      Patient Active Problem List   Diagnosis    AR (allergic rhinitis)    Gout    Hyperlipidemia LDL goal <100    History of prostate cancer    Diverticulosis of large intestine without hemorrhage    Gastroesophageal reflux disease    Hypermetropia    Impotence of organic origin    Presbyopia    Regular astigmatism    Right arm pain    Vitamin D deficiency disease     Allergies:  He is allergic to latex.    Current Medications:  Outpatient Medications Marked as Taking for the 6/17/25 encounter (Office Visit) with Agusto Howell MD   Medication Sig    Sildenafil Citrate 100 MG Oral Tab Take 1 tablet (100 mg total) by  mouth daily as needed for Erectile Dysfunction. Take ~ one hour prior to sexual activity on an empty stomach    ALLOPURINOL 300 MG Oral Tab TAKE ONE-HALF (1/2) TABLET DAILY    ATORVASTATIN 10 MG Oral Tab TAKE 1 TABLET NIGHTLY    aspirin 81 MG Oral Tab EC Take 1 tablet (81 mg total) by mouth daily.    cholecalciferol 125 MCG (5000 UT) Oral Cap Take 1 capsule (5,000 Units total) by mouth daily.    Multiple Vitamins-Minerals (OCUVITE EYE HEALTH FORMULA) Oral Cap Take 1 capsule by mouth daily.    loratadine 10 MG Oral Tab Take 1 tablet (10 mg total) by mouth daily.    fluticasone propionate 50 MCG/ACT Nasal Suspension by Each Nare route daily.    PAPAVERINE HCL IJ Inject as directed. FOR ED       Medical History:  He  has a past medical history of Allergies, Cancer (HCC), Esophageal reflux (2019), Gout (2019), High cholesterol, motion sickness, Prostate cancer (HCC) (2011), and Visual impairment.  Surgical History:  He  has a past surgical history that includes laparoscopy, surgical prostatectomy, retropubic radical, w/nerve sparing; hernia surgery; colonoscopy (N/A, 12/17/2024); vasectomy; and other surgical history (2011).   Family History:  His family history includes Prostate Cancer in his father and paternal grandfather.  Social History:  He  reports that he has never smoked. He has never used smokeless tobacco. He reports current alcohol use of about 11.0 standard drinks of alcohol per week. He reports that he does not use drugs.    Tobacco:  He has never smoked tobacco.    CAGE Alcohol Screen:   CAGE screening score of 0 on 6/16/2025, showing low risk of alcohol abuse.      Patient Care Team:  Agusto Howell MD as PCP - General (Family Medicine)    Review of Systems  Negative except as noted in HPI, some ongoing knee issues, not holding him back too much.    Objective:   Physical Exam  General Appearance:  Alert, cooperative, no distress, appears stated age   Head:  Normocephalic, without obvious abnormality,  atraumatic   Eyes:  PERRL, conjunctiva/corneas clear, EOM's intact, both eyes   Ears:  Normal TM's and external ear canals, both ears   Nose: Nares normal, septum midline, mucosa normal, no drainage or sinus tenderness   Throat: Lips, mucosa, and tongue normal; teeth and gums normal   Neck: Supple, symmetrical, trachea midline, no adenopathy, thyroid: not enlarged, symmetric, no tenderness/mass/nodules, no carotid bruit or JVD   Back:   Symmetric, no curvature, ROM normal, no CVA tenderness   Lungs:   Clear to auscultation bilaterally, respirations unlabored   Chest Wall:  No tenderness or deformity   Heart:  Regular rate and rhythm, S1, S2 normal, no murmur, rub or gallop   Abdomen:   Soft, non-tender, bowel sounds active all four quadrants,  no masses, no organomegaly           Extremities: Extremities normal, atraumatic, no cyanosis or edema   Pulses: 2+ and symmetric   Skin: Skin color, texture, turgor normal, no rashes or lesions   Lymph nodes: Cervical, supraclavicular, and axillary nodes normal   Neurologic: Normal     /60   Pulse 66   Resp 15   Ht 5' 11\" (1.803 m)   Wt 196 lb (88.9 kg)   SpO2 96%   BMI 27.34 kg/m²  Estimated body mass index is 27.34 kg/m² as calculated from the following:    Height as of this encounter: 5' 11\" (1.803 m).    Weight as of this encounter: 196 lb (88.9 kg).    Medicare Hearing Assessment:   Hearing Screening    Screening Method: Finger Rub  Finger Rub Result: Pass         Visual Acuity:   Right Eye Visual Acuity: Corrected Right Eye Chart Acuity: 20/25   Left Eye Visual Acuity: Corrected Left Eye Chart Acuity: 20/30   Both Eyes Visual Acuity: Corrected Both Eyes Chart Acuity: 20/25   Able To Tolerate Visual Acuity: Yes        Assessment & Plan:   Duncan Herrera is a 69 year old male who presents for a Medicare Assessment.     1. Encounter for annual health examination (Primary)  -     Lipid Panel; Future; Expected date: 06/17/2025  -     Comp Metabolic Panel  (14); Future; Expected date: 06/17/2025  -     CBC With Differential With Platelet; Future; Expected date: 06/17/2025  -     Vitamin D; Future; Expected date: 06/17/2025  2. Hyperlipidemia LDL goal <100 - Will check, has been controlled  3. Gout, unspecified cause, unspecified chronicity, unspecified site - Controlled, will continue albuterol  -     Uric Acid; Future; Expected date: 06/17/2025  4. Screening for deficiency anemia  -     CBC With Differential With Platelet; Future; Expected date: 06/17/2025  5. Screening for cardiovascular condition  -     Lipid Panel; Future; Expected date: 06/17/2025            The patient indicates understanding of these issues and agrees to the plan.  Lab work ordered.  Reinforced healthy diet, lifestyle, and exercise.      No follow-ups on file.     Agusto Howell MD, 6/17/2025     Supplementary Documentation:   General Health:  In the past six months, have you lost more than 10 pounds without trying?: (Patient-Rptd) 2 - No  Has your appetite been poor?: (Patient-Rptd) No  Type of Diet: (Patient-Rptd) Balanced  How does the patient maintain a good energy level?: (Patient-Rptd) Daily Walks, Other  How would you describe your daily physical activity?: (Patient-Rptd) Moderate  How would you describe your current health state?: (Patient-Rptd) Good  How do you maintain positive mental well-being?: (Patient-Rptd) Social Interaction, Puzzles, Games, Visiting Friends, Visiting Family  On a scale of 0 to 10, with 0 being no pain and 10 being severe pain, what is your pain level?: (Patient-Rptd) 1 - (Mild)  In the past six months, have you experienced urine leakage?: (Patient-Rptd) 0-No  At any time do you feel concerned for the safety/well-being of yourself and/or your children, in your home or elsewhere?: (Patient-Rptd) No  Have you had any immunizations at another office such as Influenza, Hepatitis B, Tetanus, or Pneumococcal?: (Patient-Rptd) Yes    Health Maintenance   Topic Date Due     COVID-19 Vaccine (6 - 2024-25 season) 09/01/2024    Annual Well Visit  01/01/2025    Colorectal Cancer Screening  12/17/2029    Influenza Vaccine  Completed    Annual Depression Screening  Completed    Fall Risk Screening (Annual)  Completed    Pneumococcal Vaccine: 50+ Years  Completed    Zoster Vaccines  Completed    Meningococcal B Vaccine  Aged Out

## 2025-06-25 ENCOUNTER — PATIENT MESSAGE (OUTPATIENT)
Dept: FAMILY MEDICINE CLINIC | Facility: CLINIC | Age: 70
End: 2025-06-25

## 2025-06-25 NOTE — TELEPHONE ENCOUNTER
Doesn't have to check vitamin D, but looks like a new draw will be needed if desired.    Agusto Howell MD

## 2025-06-25 NOTE — TELEPHONE ENCOUNTER
It looks like lab missed drawing vitamin D level from 6/17/25. Do you want patient to return to lab for draw? Please advise

## 2025-07-10 ENCOUNTER — OFFICE VISIT (OUTPATIENT)
Dept: SURGERY | Facility: CLINIC | Age: 70
End: 2025-07-10
Payer: MEDICARE

## 2025-07-10 DIAGNOSIS — C61 PROSTATE CANCER (HCC): Primary | ICD-10-CM

## 2025-07-10 DIAGNOSIS — N52.9 ERECTILE DYSFUNCTION OF ORGANIC ORIGIN: ICD-10-CM

## 2025-07-10 PROCEDURE — 99214 OFFICE O/P EST MOD 30 MIN: CPT | Performed by: SURGERY

## 2025-07-10 PROCEDURE — G2211 COMPLEX E/M VISIT ADD ON: HCPCS | Performed by: SURGERY

## 2025-07-10 NOTE — PROGRESS NOTES
RN placed the Trimix order to [facility] via portal today, 7/10    Trimix #8  Inject 5 units intracavernosally as instructed  Increase or decrease by 2-3  units until desired effect achieved. Maximum dose 40 units     Dispense: 1 month supply   Refills: 11  Syringe: 1cc: 31 gauge x 5/16\" insulin syringes  Qty: 5 ml    May use 3-4 times weekly

## 2025-07-10 NOTE — PROGRESS NOTES
Urology Clinic Note    Primary Care Provider:  Agusto Howell MD     Chief Complaint:   Prostate cancer     HPI & Assessment:   Duncan Herrera is a 69 year old male with history of GERD, gout, hyperlipidemia, Jefferson 7 prostate cancer status post robot-assisted laparoscopic radical prostatectomy in June 2011.  Margins were negative.  Initial PSA was 5.2, and since surgery PSA has remained undetectable, last on 3/27/2023.  He is transferring his urologic care to [place] since he recently moved from [place] given grandchildren in the area.  He had an umbilical hernia and recently underwent successful umbilical hernia repair.       He has very minimal stress incontinence and continues regular Kegel exercises.  He uses intracavernosal injections periodically for erectile dysfunction that works well for him (~8 mL of papaverine/phentolamine/PGE1 (TRIMIX) 15mg/0.5mg/5mcg).     I transitioned his Trimix order to [facility] Trimix # 5.  He has not really been using this.     PSA undetectable on 6/17/2025.     Doing well at this time.  No bothersome urinary symptoms or stress incontinence.  Trimix injections working well for him, but interested in trying Viagra since he has not tried pills in a long time.    He gets about 60% erections with Viagra only but has significant side effects.    AUA symptom score is 4/1 with LUTS.    Plan:   - Return to clinic in 1 year with PSA prior  - Restart Trimix injections 5 units as needed, increase by 2-3 units at a time, max dose 40 units ([facility] script sent)       PSA:  Lab Results   Component Value Date    PSA <0.04 06/17/2025    PSA <0.01 03/25/2024    PSA <0.01 03/27/2023        History:   Past Medical History[1]    Past Surgical History[2]    Family History[3]    Short Social Hx on File[4]    Medications (Active prior to today's visit):  Current Medications[5]    Allergies:  Allergies[6]    Review of Systems:   A comprehensive 10-point review of systems was completed.   Pertinent positives and negatives are noted in the the HPI.    Physical Exam:   CONSTITUTIONAL: Well developed, well nourished, in no acute distress  NEUROLOGIC: Alert and oriented  HEAD: Normocephalic, atraumatic  EYES: Sclera non-icteric  ENT: Hearing intact, moist mucous membranes  NECK: No obvious goiter or masses  RESPIRATORY: Normal respiratory effort  SKIN: No evident rashes  ABDOMEN: Soft, non-tender, non-distended      In total, 30 minutes were spent on this patient encounter (including chart review, patient history, physical, counseling, documentation, and communication).    Derick Leon MD  Staff Urologist  [facility]  Office: [phone]           [1]   Past Medical History:   Allergies    Cancer (HCC)    Esophageal reflux    Gout    High cholesterol    Hx of motion sickness    Prostate cancer (HCC)    Visual impairment   [2]   Past Surgical History:  Procedure Laterality Date    Colonoscopy N/A 12/17/2024    Procedure: COLONOSCOPY WITH FORCEP POLYPECTOMY;  Surgeon: Cl Ibarra DO;  Location:  ENDOSCOPY    Hernia surgery      umbilical hernia repair    Laparoscopy, surgical prostatectomy, retropubic radical, w/nerve sparing      Other surgical history  2011    Vasectomy     [3]   Family History  Problem Relation Age of Onset    Prostate Cancer Father     Prostate Cancer Paternal Grandfather    [4]   Social History  Socioeconomic History    Marital status:    Tobacco Use    Smoking status: Never    Smokeless tobacco: Never   Vaping Use    Vaping status: Never Used   Substance and Sexual Activity    Alcohol use: Yes     Alcohol/week: 11.0 standard drinks of alcohol     Types: 5 Glasses of wine, 6 Standard drinks or equivalent per week     Comment: daily    Drug use: Never     Social Drivers of Health     Food Insecurity: No Food Insecurity (6/17/2025)    NCSS - Food Insecurity     Worried About Running Out of Food in the Last Year: No     Ran Out of Food in the Last Year: No    Transportation Needs: No Transportation Needs (6/17/2025)    NCSS - Transportation     Lack of Transportation: No   Housing Stability: Not At Risk (6/17/2025)    NCSS - Housing/Utilities     Has Housing: Yes     Worried About Losing Housing: No     Unable to Get Utilities: No   [5]   Current Outpatient Medications   Medication Sig Dispense Refill    Sildenafil Citrate 100 MG Oral Tab Take 1 tablet (100 mg total) by mouth daily as needed for Erectile Dysfunction. Take ~ one hour prior to sexual activity on an empty stomach 30 tablet 11    ALLOPURINOL 300 MG Oral Tab TAKE ONE-HALF (1/2) TABLET DAILY 45 tablet 3    ATORVASTATIN 10 MG Oral Tab TAKE 1 TABLET NIGHTLY 90 tablet 3    aspirin 81 MG Oral Tab EC Take 1 tablet (81 mg total) by mouth daily.      cholecalciferol 125 MCG (5000 UT) Oral Cap Take 1 capsule (5,000 Units total) by mouth daily.      Multiple Vitamins-Minerals (OCUVITE EYE HEALTH FORMULA) Oral Cap Take 1 capsule by mouth daily.      loratadine 10 MG Oral Tab Take 1 tablet (10 mg total) by mouth daily.      fluticasone propionate 50 MCG/ACT Nasal Suspension by Each Nare route daily.      PAPAVERINE HCL IJ Inject as directed. FOR ED     [6]   Allergies  Allergen Reactions    Latex RASH

## 2025-08-13 ENCOUNTER — OFFICE VISIT (OUTPATIENT)
Facility: CLINIC | Age: 70
End: 2025-08-13

## 2025-08-13 VITALS
OXYGEN SATURATION: 98 % | WEIGHT: 196.19 LBS | HEART RATE: 65 BPM | DIASTOLIC BLOOD PRESSURE: 64 MMHG | BODY MASS INDEX: 27.47 KG/M2 | HEIGHT: 71 IN | RESPIRATION RATE: 18 BRPM | SYSTOLIC BLOOD PRESSURE: 112 MMHG

## 2025-08-13 DIAGNOSIS — G47.33 OBSTRUCTIVE SLEEP APNEA, ADULT: Primary | ICD-10-CM

## 2025-08-13 PROCEDURE — 3008F BODY MASS INDEX DOCD: CPT | Performed by: PHYSICIAN ASSISTANT

## 2025-08-13 PROCEDURE — 99213 OFFICE O/P EST LOW 20 MIN: CPT | Performed by: PHYSICIAN ASSISTANT

## 2025-08-13 PROCEDURE — 3078F DIAST BP <80 MM HG: CPT | Performed by: PHYSICIAN ASSISTANT

## 2025-08-13 PROCEDURE — 3074F SYST BP LT 130 MM HG: CPT | Performed by: PHYSICIAN ASSISTANT

## (undated) DEVICE — VIOLET BRAIDED (POLYGLACTIN 910), SYNTHETIC ABSORBABLE SUTURE: Brand: COATED VICRYL

## (undated) DEVICE — CURAD PAPER TAPE 2IN

## (undated) DEVICE — GIJAW SINGLE-USE BIOPSY FORCEPS WITH NEEDLE: Brand: GIJAW

## (undated) DEVICE — SUT ETHIBOND 0 MO-7 CX41D

## (undated) DEVICE — LIGHT HANDLE

## (undated) DEVICE — SLEEVE KENDALL SCD EXPRESS MED

## (undated) DEVICE — SUT VICRYL 3-0 SH J416H

## (undated) DEVICE — STERILE POLYISOPRENE POWDER-FREE SURGICAL GLOVES: Brand: PROTEXIS

## (undated) DEVICE — PTFE COATED BLADE 2.75': Brand: MEDLINE

## (undated) DEVICE — 10FT COMBINED O2 DELIVERY/CO2 MONITORING. FILTER WITH MICROSTREAM TYPE LUER: Brand: DUAL ADULT NASAL CANNULA

## (undated) DEVICE — 3M™ RED DOT™ MONITORING ELECTRODE WITH FOAM TAPE AND STICKY GEL, 50/BAG, 20/CASE, 72/PLT 2570: Brand: RED DOT™

## (undated) DEVICE — LAPAROTOMY CDS: Brand: MEDLINE INDUSTRIES, INC.

## (undated) DEVICE — KIT CUSTOM ENDOPROCEDURE STERIS

## (undated) DEVICE — PROXIMATE RH ROTATING HEAD SKIN STAPLERS (35 WIDE) CONTAINS 35 STAINLESS STEEL STAPLES: Brand: PROXIMATE

## (undated) DEVICE — APPLICATOR CHLORAPREP 26ML

## (undated) DEVICE — STANDARD HYPODERMIC NEEDLE,POLYPROPYLENE HUB: Brand: MONOJECT

## (undated) DEVICE — V2 SPECIMEN COLLECTION MANIFOLD KIT: Brand: NEPTUNE

## (undated) DEVICE — 1200CC GUARDIAN II: Brand: GUARDIAN

## (undated) DEVICE — SOL NACL IRRIG 0.9% 1000ML BTL

## (undated) DEVICE — SUT VICRYL 3-0 J110T

## (undated) DEVICE — UNDYED BRAIDED (POLYGLACTIN 910), SYNTHETIC ABSORBABLE SUTURE: Brand: COATED VICRYL

## (undated) DEVICE — KIT VLV 5 PC AIR H2O SUCT BX ENDOGATOR CONN

## (undated) NOTE — LETTER
22    Patient: Kamla Hopper  :  Visit date: 2022    Dear  Jus Arita MD    Thank you for referring Kamla Hopper to my practice. Please find my assessment and plan below. History of Present Illness     Patient is a 49-year-old gentleman who is here for evaluation of julianne -umbilical incisional hernia    Patient reports that he noted this hernia approximately 2 or 3 years ago. He reports that he did have a robotic prostatectomy in  and the hernia appears to be in the area of the prior periumbilical incision. He believes the hernia is related to the previous incision. The patient reports that over the past 12 months he has noted that this hernia has increased in size    On examination the patient's umbilicus does protrude through the centrum. The hernia is reducible. This likely represents a periumbilical incisional hernia. Well-healed supraumbilical incision is noted    Treatment options were reviewed in detail with the patient. At this time he does wish to proceed with surgical reduction and repair. Possible need for mesh pending intraoperative findings was discussed. The patient has no questions at this time wishes to proceed with surgical scheduling. He will call the office to schedule surgery once he has had an opportunity to discuss this with his wife    Assessment   No diagnosis found. Plan     Reduction and repair of periumbilical incisional hernia-the possible need for mesh pending intraoperative findings was discussed. The patient will discuss timing of surgery with his wife.   He will contact this office to schedule surgery after his conversation with his wife        Sincerely,       Ridge Marcano MD   CC: No Recipients

## (undated) NOTE — LETTER
Michelle Angel M.D., F.A.C.S. Surgical Clearance Needed    Date: 12/5/2022                                                                       From: Nate Kahn    Attn: Dr. Ana María Vale                                                                                   RE: Surgical Clearance               # of Pages: 1        Patient Name: Adrien Russ    Patient YOB: 1955    Consult For: Medical Clearance    Surgery: Ventral Hernia Repair With Possible Mesh - Complex    Date of Surgery: TBD        This facsimile transmission is provided for your internal use only. If the reader of this message is not the intended recipient, you are hereby notified that you have received this document in error, and that any review, dissemination, distribution, or copying of this message is strictly prohibited. If you have received this communication in error, please notify us immediately by telephone and return the original message to us by mail.

## (undated) NOTE — LETTER
24    Patient: Duncan Herrera  : 1955 Visit date: 2024    Dear  Agusto Howell MD    Thank you for referring Duncan Herrera to my practice.  Please find my assessment and plan below.        I saw Duncan in my office, he presents with a history of hemorrhoids.  This is an isolated event for him.  He does not have chronic hemorrhoidal disease.  There is no indication for hemorrhoidectomy at this time however patient is past due for his colonoscopy.  I am scheduling him for colonoscopy.  Thank you Michel  Sincerely,       Cl Ibarra DO   CC:   No Recipients